# Patient Record
Sex: MALE | Race: WHITE | NOT HISPANIC OR LATINO
[De-identification: names, ages, dates, MRNs, and addresses within clinical notes are randomized per-mention and may not be internally consistent; named-entity substitution may affect disease eponyms.]

---

## 2018-03-07 ENCOUNTER — TRANSCRIPTION ENCOUNTER (OUTPATIENT)
Age: 48
End: 2018-03-07

## 2021-03-23 ENCOUNTER — EMERGENCY (EMERGENCY)
Facility: HOSPITAL | Age: 51
LOS: 1 days | Discharge: HOME | End: 2021-03-23
Attending: EMERGENCY MEDICINE
Payer: COMMERCIAL

## 2021-03-23 ENCOUNTER — FORM ENCOUNTER (OUTPATIENT)
Age: 51
End: 2021-03-23

## 2021-03-23 VITALS
TEMPERATURE: 98 F | DIASTOLIC BLOOD PRESSURE: 103 MMHG | HEIGHT: 71 IN | WEIGHT: 265 LBS | RESPIRATION RATE: 20 BRPM | SYSTOLIC BLOOD PRESSURE: 177 MMHG | HEART RATE: 62 BPM | OXYGEN SATURATION: 97 %

## 2021-03-23 VITALS
RESPIRATION RATE: 20 BRPM | TEMPERATURE: 98 F | HEART RATE: 67 BPM | DIASTOLIC BLOOD PRESSURE: 72 MMHG | SYSTOLIC BLOOD PRESSURE: 124 MMHG | OXYGEN SATURATION: 98 %

## 2021-03-23 PROBLEM — Z00.00 ENCOUNTER FOR PREVENTIVE HEALTH EXAMINATION: Status: ACTIVE | Noted: 2021-03-23

## 2021-03-23 LAB
ALBUMIN SERPL ELPH-MCNC: 4.8 G/DL — SIGNIFICANT CHANGE UP (ref 3.3–5)
ALP SERPL-CCNC: 65 U/L — SIGNIFICANT CHANGE UP (ref 40–120)
ALT FLD-CCNC: 36 U/L — SIGNIFICANT CHANGE UP (ref 10–45)
ANION GAP SERPL CALC-SCNC: 15 MMOL/L — SIGNIFICANT CHANGE UP (ref 5–17)
AST SERPL-CCNC: 33 U/L — SIGNIFICANT CHANGE UP (ref 10–40)
BASOPHILS # BLD AUTO: 0.06 K/UL — SIGNIFICANT CHANGE UP (ref 0–0.2)
BASOPHILS NFR BLD AUTO: 0.8 % — SIGNIFICANT CHANGE UP (ref 0–2)
BILIRUB SERPL-MCNC: 2.6 MG/DL — HIGH (ref 0.2–1.2)
BUN SERPL-MCNC: 13 MG/DL — SIGNIFICANT CHANGE UP (ref 7–23)
CALCIUM SERPL-MCNC: 9.7 MG/DL — SIGNIFICANT CHANGE UP (ref 8.4–10.5)
CHLORIDE SERPL-SCNC: 102 MMOL/L — SIGNIFICANT CHANGE UP (ref 96–108)
CO2 SERPL-SCNC: 24 MMOL/L — SIGNIFICANT CHANGE UP (ref 22–31)
CREAT SERPL-MCNC: 0.8 MG/DL — SIGNIFICANT CHANGE UP (ref 0.5–1.3)
EOSINOPHIL # BLD AUTO: 0.25 K/UL — SIGNIFICANT CHANGE UP (ref 0–0.5)
EOSINOPHIL NFR BLD AUTO: 3.1 % — SIGNIFICANT CHANGE UP (ref 0–6)
GLUCOSE SERPL-MCNC: 81 MG/DL — SIGNIFICANT CHANGE UP (ref 70–99)
HCT VFR BLD CALC: 47.8 % — SIGNIFICANT CHANGE UP (ref 39–50)
HGB BLD-MCNC: 16.3 G/DL — SIGNIFICANT CHANGE UP (ref 13–17)
IMM GRANULOCYTES NFR BLD AUTO: 0.3 % — SIGNIFICANT CHANGE UP (ref 0–1.5)
LYMPHOCYTES # BLD AUTO: 1.49 K/UL — SIGNIFICANT CHANGE UP (ref 1–3.3)
LYMPHOCYTES # BLD AUTO: 18.8 % — SIGNIFICANT CHANGE UP (ref 13–44)
MCHC RBC-ENTMCNC: 30.1 PG — SIGNIFICANT CHANGE UP (ref 27–34)
MCHC RBC-ENTMCNC: 34.1 GM/DL — SIGNIFICANT CHANGE UP (ref 32–36)
MCV RBC AUTO: 88.4 FL — SIGNIFICANT CHANGE UP (ref 80–100)
MONOCYTES # BLD AUTO: 0.56 K/UL — SIGNIFICANT CHANGE UP (ref 0–0.9)
MONOCYTES NFR BLD AUTO: 7.1 % — SIGNIFICANT CHANGE UP (ref 2–14)
NEUTROPHILS # BLD AUTO: 5.56 K/UL — SIGNIFICANT CHANGE UP (ref 1.8–7.4)
NEUTROPHILS NFR BLD AUTO: 69.9 % — SIGNIFICANT CHANGE UP (ref 43–77)
NRBC # BLD: 0 /100 WBCS — SIGNIFICANT CHANGE UP (ref 0–0)
PLATELET # BLD AUTO: 190 K/UL — SIGNIFICANT CHANGE UP (ref 150–400)
POTASSIUM SERPL-MCNC: 5 MMOL/L — SIGNIFICANT CHANGE UP (ref 3.5–5.3)
POTASSIUM SERPL-SCNC: 5 MMOL/L — SIGNIFICANT CHANGE UP (ref 3.5–5.3)
PROT SERPL-MCNC: 8.1 G/DL — SIGNIFICANT CHANGE UP (ref 6–8.3)
RBC # BLD: 5.41 M/UL — SIGNIFICANT CHANGE UP (ref 4.2–5.8)
RBC # FLD: 12.8 % — SIGNIFICANT CHANGE UP (ref 10.3–14.5)
SODIUM SERPL-SCNC: 141 MMOL/L — SIGNIFICANT CHANGE UP (ref 135–145)
TROPONIN T, HIGH SENSITIVITY RESULT: 6 NG/L — SIGNIFICANT CHANGE UP (ref 0–51)
TSH SERPL-MCNC: 2.18 UIU/ML — SIGNIFICANT CHANGE UP (ref 0.27–4.2)
WBC # BLD: 7.94 K/UL — SIGNIFICANT CHANGE UP (ref 3.8–10.5)
WBC # FLD AUTO: 7.94 K/UL — SIGNIFICANT CHANGE UP (ref 3.8–10.5)

## 2021-03-23 PROCEDURE — 99285 EMERGENCY DEPT VISIT HI MDM: CPT

## 2021-03-23 PROCEDURE — 99284 EMERGENCY DEPT VISIT MOD MDM: CPT | Mod: 25

## 2021-03-23 PROCEDURE — 80053 COMPREHEN METABOLIC PANEL: CPT

## 2021-03-23 PROCEDURE — 85025 COMPLETE CBC W/AUTO DIFF WBC: CPT

## 2021-03-23 PROCEDURE — 36000 PLACE NEEDLE IN VEIN: CPT

## 2021-03-23 PROCEDURE — 71046 X-RAY EXAM CHEST 2 VIEWS: CPT

## 2021-03-23 PROCEDURE — 84484 ASSAY OF TROPONIN QUANT: CPT

## 2021-03-23 PROCEDURE — 71046 X-RAY EXAM CHEST 2 VIEWS: CPT | Mod: 26

## 2021-03-23 PROCEDURE — 93010 ELECTROCARDIOGRAM REPORT: CPT

## 2021-03-23 PROCEDURE — 93005 ELECTROCARDIOGRAM TRACING: CPT

## 2021-03-23 PROCEDURE — 84443 ASSAY THYROID STIM HORMONE: CPT

## 2021-03-23 RX ORDER — METOPROLOL TARTRATE 50 MG
1 TABLET ORAL
Qty: 14 | Refills: 0
Start: 2021-03-23 | End: 2021-03-29

## 2021-03-23 NOTE — ED PROVIDER NOTE - OBJECTIVE STATEMENT
49 y/o M with no reported PMH presenting with palpitations. Patient states has been having intermittent palpitations since last night. Feels like heart is beating fast. Does not have any associated cp, sob, lightheadedness, n/v, abd pain. Not associated with exertion. Patient notes he had 3 episodes of diarrhea yesterday, has been hydrating since. No episode of similar hx in past.  No known hx of thyroid abnormalities

## 2021-03-23 NOTE — ED PROVIDER NOTE - NS ED ROS FT
CONSTITUTIONAL: No fevers, no chills, no lightheadedness, no dizziness  Eyes: no visual changes  Ears: no ear drainage, no ear pain  Nose: no nasal congestion  Mouth/Throat: no sore throat  CV: +palpitations, No chest pain  PULM: No SOB, no cough  GI: No n/v/d, no abd pain  : no dysuria, no hematuria  SKIN: no rashes.  NEURO: no headache, no focal weakness or numbness  LYMPH/VASC: no LE swelling

## 2021-03-23 NOTE — ED PROVIDER NOTE - PHYSICAL EXAMINATION
gen: obese  Mentation: AAO x 3  psych: mood appropriate  ENT: airway patent  Eyes: conjunctivae clear bilaterally  Cardio: RRR, no m/r/g  Resp: normal BS b/l  GI: s/nt/nd  : no CVA tenderness  Neuro: sensation and motor function intact  Skin: No evidence of rash  MSK: normal movement of all extremities  Lymph/Vasc: no LE edema

## 2021-03-23 NOTE — ED PROVIDER NOTE - NSFOLLOWUPINSTRUCTIONS_ED_ALL_ED_FT
Palpitations    A palpitation is the feeling that your heartbeat is irregular or is faster than normal. It may feel like your heart is fluttering or skipping a beat. They may be caused by many things, including smoking, caffeine, alcohol, stress, and certain medicines. Although most causes of palpitations are not serious, palpitations can be a sign of a serious medical problem. Avoid caffeine, alcohol, and tobacco products at home. Try to reduce stress and anxiety and make sure to get plenty of rest.     SEEK IMMEDIATE MEDICAL CARE IF YOU HAVE ANY OF THE FOLLOWING SYMPTOMS: chest pain, shortness of breath, severe headache, dizziness/lightheadedness, or fainting.    Please follow up with cardiology within the next 2-3 days. Your information has been given to our discharge team, someone will contact you to make an appointment. You need a Holter monitor and and echocardiogram

## 2021-03-23 NOTE — ED PROVIDER NOTE - PATIENT PORTAL LINK FT
You can access the FollowMyHealth Patient Portal offered by [facility] by registering at the following website: [email or web address]. By joining [employer]’s FollowMyHealth portal, you will also be able to view your health information using other applications (apps) compatible with our system.

## 2021-03-23 NOTE — ED PROVIDER NOTE - CLINICAL SUMMARY MEDICAL DECISION MAKING FREE TEXT BOX
DO Deborah PGY-2: 49 y/o M presenting with intermittent palpitations yesterday, currently in NSR. Will keep on cardiac monitor. Will eval for electrolyte abnormalities, cardiac ischemia. Dispo pending results : 49 y/o M account  morbidly obese  presenting with intermittent palpitations yesterday, currently in NSR. Will keep on cardiac monitor. Will eval for electrolyte abnormalities, cardiac ischemia. Dispo pending results ZR

## 2021-03-23 NOTE — ED ADULT NURSE NOTE - OBJECTIVE STATEMENT
Patient  is  alert  and  oriented x3.  Color  is  good  and  skin warm  to touch.  He   is  c/o  palpitations. He  denies  chest pain  or  SOB

## 2021-03-24 ENCOUNTER — APPOINTMENT (OUTPATIENT)
Dept: CARDIOLOGY | Facility: CLINIC | Age: 51
End: 2021-03-24
Payer: COMMERCIAL

## 2021-03-24 ENCOUNTER — NON-APPOINTMENT (OUTPATIENT)
Age: 51
End: 2021-03-24

## 2021-03-24 ENCOUNTER — APPOINTMENT (OUTPATIENT)
Dept: ELECTROPHYSIOLOGY | Facility: CLINIC | Age: 51
End: 2021-03-24
Payer: COMMERCIAL

## 2021-03-24 VITALS
SYSTOLIC BLOOD PRESSURE: 139 MMHG | WEIGHT: 264 LBS | OXYGEN SATURATION: 96 % | HEIGHT: 71 IN | DIASTOLIC BLOOD PRESSURE: 90 MMHG | HEART RATE: 83 BPM | BODY MASS INDEX: 36.96 KG/M2

## 2021-03-24 DIAGNOSIS — E66.9 OBESITY, UNSPECIFIED: ICD-10-CM

## 2021-03-24 DIAGNOSIS — R00.2 PALPITATIONS: ICD-10-CM

## 2021-03-24 DIAGNOSIS — Z82.49 FAMILY HISTORY OF ISCHEMIC HEART DISEASE AND OTHER DISEASES OF THE CIRCULATORY SYSTEM: ICD-10-CM

## 2021-03-24 DIAGNOSIS — E78.00 PURE HYPERCHOLESTEROLEMIA, UNSPECIFIED: ICD-10-CM

## 2021-03-24 DIAGNOSIS — Z78.9 OTHER SPECIFIED HEALTH STATUS: ICD-10-CM

## 2021-03-24 PROCEDURE — 99072 ADDL SUPL MATRL&STAF TM PHE: CPT

## 2021-03-24 PROCEDURE — 99203 OFFICE O/P NEW LOW 30 MIN: CPT

## 2021-03-24 PROCEDURE — 93000 ELECTROCARDIOGRAM COMPLETE: CPT

## 2021-03-24 NOTE — HISTORY OF PRESENT ILLNESS
[FreeTextEntry1] : 50-year old man with a history of obesity, seen in the emergency room yesterday. \par \par The medical note from the ER reads as follows: "Chief Complaint: The patient is a 50y Male complaining of:palpitations. Patient states has been having intermittent palpitations since last night. Feels like heart is beating fast. Does not have any associated cp, sob, lightheadedness, n/v, abd pain. Not associated with exertion.No known hx of thyroid abnormalities".\par \par Mr. Kendrick tells me that he has noted intermittent episodes of the sensation of a "flutter" in the chest; several episodes lasting less than two minutes. These started two days ago. He was seen at both an [facility]urgicare" center and the [facility] emergency department; no abnormal rhythms were detected. He has not experienced syncope, lightheadedness, dyspnea, or chest pain. \par \par There is no history of smoking or type-2 diabetes. Lipids in January (patient has these results on his smart phone): (01/07/2021): Total cholesterol 209 mg%, /, H42 mg%. His father had a myocardial infarction in his late 50s, and several stents.  \par \par ADRIANA Screening: Snores; often tired during the day. When gets home from work, dozes off "very easily".\par \par

## 2021-03-24 NOTE — PHYSICAL EXAM
[Normal Conjunctiva] : the conjunctiva exhibited no abnormalities [Heart Sounds] : normal S1 and S2 [Murmurs] : no murmurs present [Edema] : no peripheral edema present [] : no respiratory distress [Respiration, Rhythm And Depth] : normal respiratory rhythm and effort [Exaggerated Use Of Accessory Muscles For Inspiration] : no accessory muscle use [Auscultation Breath Sounds / Voice Sounds] : lungs were clear to auscultation bilaterally [Abdomen Soft] : soft [Abnormal Walk] : normal gait [Nail Clubbing] : no clubbing of the fingernails [Cyanosis, Localized] : no localized cyanosis [Skin Color & Pigmentation] : normal skin color and pigmentation [Oriented To Time, Place, And Person] : oriented to person, place, and time [Impaired Insight] : insight and judgment were intact [Affect] : the affect was normal [Mood] : the mood was normal [Memory Recent] : recent memory was not impaired [Memory Remote] : remote memory was not impaired [No Anxiety] : not feeling anxious [FreeTextEntry1] : Obese.

## 2021-03-24 NOTE — ASSESSMENT
[FreeTextEntry1] : Problems:\par Palpitations - sounds real (i.e. does not sound like anxiety). Furthermore, obesity is associated with sleep apnea and in turn that is associated with atrial fibrillation.\par Probably hypertensive, given age and weight \par Obesity\par Possible ADRIANA\par Hyperlipidemia

## 2021-03-24 NOTE — DISCUSSION/SUMMARY
COVID-19 Screening [NEGATIVE];     E-Visit Submission: Redness of eye    Patient submitted an E-Visit Request and was disqualified.     Appointment for phone visit for 5/13      Question: Please enter the details of your  past eye problems   Patient:   Painful between eye and tip of nose  all day and when I wake up from sleeping my  eye is stuck. I went to the emergency room at  [facility] in [place] on 4/27/20  and was  diagnosed with Acquired nasolacrimal duct  obstruction, left. They did the strip and drop in  the eye (fluorescein,tetracaine) it felt better for  about a week with the warm compress but it’s  hurting again. They said if it don’t get better I  need a referral to see Ophthalmology.       [FreeTextEntry1] : Plan:\par Check BP at home regularly; depending on readings, may need to institute therapy. \par Start Crestor 20 mg/d.\par Screen for ADRIANA .\par Ambulatory telemetry for three weeks.\par Prudent diet (discussed at length) and exercise as tolerated.\par Repeat lipids and check HbA1c in three months.\par

## 2021-04-22 PROCEDURE — 93228 REMOTE 30 DAY ECG REV/REPORT: CPT

## 2021-04-22 PROCEDURE — 99072 ADDL SUPL MATRL&STAF TM PHE: CPT

## 2021-09-09 ENCOUNTER — INPATIENT (INPATIENT)
Facility: HOSPITAL | Age: 51
LOS: 2 days | Discharge: HOME | DRG: 418 | End: 2021-09-12
Attending: SURGERY | Admitting: SURGERY
Payer: COMMERCIAL

## 2021-09-09 VITALS
TEMPERATURE: 98 F | DIASTOLIC BLOOD PRESSURE: 99 MMHG | HEART RATE: 62 BPM | SYSTOLIC BLOOD PRESSURE: 108 MMHG | OXYGEN SATURATION: 97 % | RESPIRATION RATE: 16 BRPM | HEIGHT: 71 IN | WEIGHT: 259.93 LBS

## 2021-09-09 DIAGNOSIS — R10.9 UNSPECIFIED ABDOMINAL PAIN: ICD-10-CM

## 2021-09-09 PROBLEM — E66.01 MORBID (SEVERE) OBESITY DUE TO EXCESS CALORIES: Chronic | Status: ACTIVE | Noted: 2021-03-23

## 2021-09-09 LAB
ALBUMIN SERPL ELPH-MCNC: 4.9 G/DL — SIGNIFICANT CHANGE UP (ref 3.3–5)
ALP SERPL-CCNC: 70 U/L — SIGNIFICANT CHANGE UP (ref 40–120)
ALT FLD-CCNC: 26 U/L — SIGNIFICANT CHANGE UP (ref 10–45)
ANION GAP SERPL CALC-SCNC: 15 MMOL/L — SIGNIFICANT CHANGE UP (ref 5–17)
APPEARANCE UR: CLEAR — SIGNIFICANT CHANGE UP
AST SERPL-CCNC: 26 U/L — SIGNIFICANT CHANGE UP (ref 10–40)
BACTERIA # UR AUTO: NEGATIVE — SIGNIFICANT CHANGE UP
BASOPHILS # BLD AUTO: 0.08 K/UL — SIGNIFICANT CHANGE UP (ref 0–0.2)
BASOPHILS NFR BLD AUTO: 0.6 % — SIGNIFICANT CHANGE UP (ref 0–2)
BILIRUB DIRECT SERPL-MCNC: 0.2 MG/DL — SIGNIFICANT CHANGE UP (ref 0–0.2)
BILIRUB SERPL-MCNC: 1.9 MG/DL — HIGH (ref 0.2–1.2)
BILIRUB UR-MCNC: NEGATIVE — SIGNIFICANT CHANGE UP
BUN SERPL-MCNC: 13 MG/DL — SIGNIFICANT CHANGE UP (ref 7–23)
CALCIUM SERPL-MCNC: 10 MG/DL — SIGNIFICANT CHANGE UP (ref 8.4–10.5)
CHLORIDE SERPL-SCNC: 101 MMOL/L — SIGNIFICANT CHANGE UP (ref 96–108)
CO2 SERPL-SCNC: 21 MMOL/L — LOW (ref 22–31)
COLOR SPEC: SIGNIFICANT CHANGE UP
CREAT SERPL-MCNC: 0.88 MG/DL — SIGNIFICANT CHANGE UP (ref 0.5–1.3)
DIFF PNL FLD: NEGATIVE — SIGNIFICANT CHANGE UP
EOSINOPHIL # BLD AUTO: 0.28 K/UL — SIGNIFICANT CHANGE UP (ref 0–0.5)
EOSINOPHIL NFR BLD AUTO: 2.1 % — SIGNIFICANT CHANGE UP (ref 0–6)
EPI CELLS # UR: 1 /HPF — SIGNIFICANT CHANGE UP
GAS PNL BLDV: SIGNIFICANT CHANGE UP
GLUCOSE SERPL-MCNC: 138 MG/DL — HIGH (ref 70–99)
GLUCOSE UR QL: NEGATIVE — SIGNIFICANT CHANGE UP
HCT VFR BLD CALC: 47.2 % — SIGNIFICANT CHANGE UP (ref 39–50)
HGB BLD-MCNC: 16.1 G/DL — SIGNIFICANT CHANGE UP (ref 13–17)
HYALINE CASTS # UR AUTO: 1 /LPF — SIGNIFICANT CHANGE UP (ref 0–2)
IMM GRANULOCYTES NFR BLD AUTO: 0.3 % — SIGNIFICANT CHANGE UP (ref 0–1.5)
KETONES UR-MCNC: NEGATIVE — SIGNIFICANT CHANGE UP
LEUKOCYTE ESTERASE UR-ACNC: NEGATIVE — SIGNIFICANT CHANGE UP
LIDOCAIN IGE QN: 19 U/L — SIGNIFICANT CHANGE UP (ref 7–60)
LYMPHOCYTES # BLD AUTO: 1.71 K/UL — SIGNIFICANT CHANGE UP (ref 1–3.3)
LYMPHOCYTES # BLD AUTO: 12.9 % — LOW (ref 13–44)
MCHC RBC-ENTMCNC: 29.8 PG — SIGNIFICANT CHANGE UP (ref 27–34)
MCHC RBC-ENTMCNC: 34.1 GM/DL — SIGNIFICANT CHANGE UP (ref 32–36)
MCV RBC AUTO: 87.2 FL — SIGNIFICANT CHANGE UP (ref 80–100)
MONOCYTES # BLD AUTO: 0.78 K/UL — SIGNIFICANT CHANGE UP (ref 0–0.9)
MONOCYTES NFR BLD AUTO: 5.9 % — SIGNIFICANT CHANGE UP (ref 2–14)
NEUTROPHILS # BLD AUTO: 10.33 K/UL — HIGH (ref 1.8–7.4)
NEUTROPHILS NFR BLD AUTO: 78.2 % — HIGH (ref 43–77)
NITRITE UR-MCNC: NEGATIVE — SIGNIFICANT CHANGE UP
NRBC # BLD: 0 /100 WBCS — SIGNIFICANT CHANGE UP (ref 0–0)
PH UR: 7 — SIGNIFICANT CHANGE UP (ref 5–8)
PLATELET # BLD AUTO: 222 K/UL — SIGNIFICANT CHANGE UP (ref 150–400)
POTASSIUM SERPL-MCNC: 4.1 MMOL/L — SIGNIFICANT CHANGE UP (ref 3.5–5.3)
POTASSIUM SERPL-SCNC: 4.1 MMOL/L — SIGNIFICANT CHANGE UP (ref 3.5–5.3)
PROT SERPL-MCNC: 8.1 G/DL — SIGNIFICANT CHANGE UP (ref 6–8.3)
PROT UR-MCNC: NEGATIVE — SIGNIFICANT CHANGE UP
RBC # BLD: 5.41 M/UL — SIGNIFICANT CHANGE UP (ref 4.2–5.8)
RBC # FLD: 12.8 % — SIGNIFICANT CHANGE UP (ref 10.3–14.5)
RBC CASTS # UR COMP ASSIST: 1 /HPF — SIGNIFICANT CHANGE UP (ref 0–4)
SARS-COV-2 RNA SPEC QL NAA+PROBE: SIGNIFICANT CHANGE UP
SODIUM SERPL-SCNC: 137 MMOL/L — SIGNIFICANT CHANGE UP (ref 135–145)
SP GR SPEC: 1.01 — SIGNIFICANT CHANGE UP (ref 1.01–1.02)
TROPONIN T, HIGH SENSITIVITY RESULT: <6 NG/L — SIGNIFICANT CHANGE UP (ref 0–51)
UROBILINOGEN FLD QL: NEGATIVE — SIGNIFICANT CHANGE UP
WBC # BLD: 13.22 K/UL — HIGH (ref 3.8–10.5)
WBC # FLD AUTO: 13.22 K/UL — HIGH (ref 3.8–10.5)
WBC UR QL: 1 /HPF — SIGNIFICANT CHANGE UP (ref 0–5)

## 2021-09-09 PROCEDURE — 76705 ECHO EXAM OF ABDOMEN: CPT | Mod: 26,RT

## 2021-09-09 PROCEDURE — 99285 EMERGENCY DEPT VISIT HI MDM: CPT | Mod: 25

## 2021-09-09 PROCEDURE — 93010 ELECTROCARDIOGRAM REPORT: CPT

## 2021-09-09 PROCEDURE — 74183 MRI ABD W/O CNTR FLWD CNTR: CPT | Mod: 26

## 2021-09-09 PROCEDURE — 71045 X-RAY EXAM CHEST 1 VIEW: CPT | Mod: 26

## 2021-09-09 RX ORDER — SODIUM CHLORIDE 9 MG/ML
1000 INJECTION, SOLUTION INTRAVENOUS
Refills: 0 | Status: DISCONTINUED | OUTPATIENT
Start: 2021-09-09 | End: 2021-09-11

## 2021-09-09 RX ORDER — SODIUM CHLORIDE 9 MG/ML
1000 INJECTION, SOLUTION INTRAVENOUS ONCE
Refills: 0 | Status: COMPLETED | OUTPATIENT
Start: 2021-09-09 | End: 2021-09-09

## 2021-09-09 RX ORDER — METRONIDAZOLE 500 MG
TABLET ORAL
Refills: 0 | Status: DISCONTINUED | OUTPATIENT
Start: 2021-09-09 | End: 2021-09-11

## 2021-09-09 RX ORDER — METRONIDAZOLE 500 MG
500 TABLET ORAL EVERY 8 HOURS
Refills: 0 | Status: DISCONTINUED | OUTPATIENT
Start: 2021-09-10 | End: 2021-09-11

## 2021-09-09 RX ORDER — ROSUVASTATIN CALCIUM 5 MG/1
1 TABLET ORAL
Qty: 0 | Refills: 0 | DISCHARGE

## 2021-09-09 RX ORDER — FAMOTIDINE 10 MG/ML
20 INJECTION INTRAVENOUS ONCE
Refills: 0 | Status: COMPLETED | OUTPATIENT
Start: 2021-09-09 | End: 2021-09-09

## 2021-09-09 RX ORDER — ACETAMINOPHEN 500 MG
975 TABLET ORAL ONCE
Refills: 0 | Status: COMPLETED | OUTPATIENT
Start: 2021-09-09 | End: 2021-09-09

## 2021-09-09 RX ORDER — ENOXAPARIN SODIUM 100 MG/ML
40 INJECTION SUBCUTANEOUS DAILY
Refills: 0 | Status: DISCONTINUED | OUTPATIENT
Start: 2021-09-09 | End: 2021-09-11

## 2021-09-09 RX ORDER — METRONIDAZOLE 500 MG
500 TABLET ORAL ONCE
Refills: 0 | Status: COMPLETED | OUTPATIENT
Start: 2021-09-09 | End: 2021-09-09

## 2021-09-09 RX ORDER — CIPROFLOXACIN LACTATE 400MG/40ML
400 VIAL (ML) INTRAVENOUS ONCE
Refills: 0 | Status: COMPLETED | OUTPATIENT
Start: 2021-09-09 | End: 2021-09-09

## 2021-09-09 RX ORDER — INFLUENZA VIRUS VACCINE 15; 15; 15; 15 UG/.5ML; UG/.5ML; UG/.5ML; UG/.5ML
0.5 SUSPENSION INTRAMUSCULAR ONCE
Refills: 0 | Status: DISCONTINUED | OUTPATIENT
Start: 2021-09-09 | End: 2021-09-12

## 2021-09-09 RX ORDER — ACETAMINOPHEN 500 MG
1000 TABLET ORAL ONCE
Refills: 0 | Status: COMPLETED | OUTPATIENT
Start: 2021-09-09 | End: 2021-09-09

## 2021-09-09 RX ORDER — ONDANSETRON 8 MG/1
4 TABLET, FILM COATED ORAL ONCE
Refills: 0 | Status: COMPLETED | OUTPATIENT
Start: 2021-09-09 | End: 2021-09-09

## 2021-09-09 RX ORDER — CIPROFLOXACIN LACTATE 400MG/40ML
VIAL (ML) INTRAVENOUS
Refills: 0 | Status: DISCONTINUED | OUTPATIENT
Start: 2021-09-09 | End: 2021-09-11

## 2021-09-09 RX ORDER — CIPROFLOXACIN LACTATE 400MG/40ML
400 VIAL (ML) INTRAVENOUS EVERY 12 HOURS
Refills: 0 | Status: DISCONTINUED | OUTPATIENT
Start: 2021-09-10 | End: 2021-09-11

## 2021-09-09 RX ORDER — ATORVASTATIN CALCIUM 80 MG/1
80 TABLET, FILM COATED ORAL AT BEDTIME
Refills: 0 | Status: DISCONTINUED | OUTPATIENT
Start: 2021-09-09 | End: 2021-09-11

## 2021-09-09 RX ADMIN — SODIUM CHLORIDE 1000 MILLILITER(S): 9 INJECTION, SOLUTION INTRAVENOUS at 03:33

## 2021-09-09 RX ADMIN — Medication 975 MILLIGRAM(S): at 06:23

## 2021-09-09 RX ADMIN — FAMOTIDINE 20 MILLIGRAM(S): 10 INJECTION INTRAVENOUS at 03:33

## 2021-09-09 RX ADMIN — Medication 30 MILLILITER(S): at 03:33

## 2021-09-09 RX ADMIN — Medication 200 MILLIGRAM(S): at 22:26

## 2021-09-09 RX ADMIN — Medication 100 MILLIGRAM(S): at 20:50

## 2021-09-09 RX ADMIN — Medication 1000 MILLIGRAM(S): at 22:48

## 2021-09-09 RX ADMIN — ATORVASTATIN CALCIUM 80 MILLIGRAM(S): 80 TABLET, FILM COATED ORAL at 22:18

## 2021-09-09 RX ADMIN — Medication 400 MILLIGRAM(S): at 22:18

## 2021-09-09 RX ADMIN — ONDANSETRON 4 MILLIGRAM(S): 8 TABLET, FILM COATED ORAL at 03:19

## 2021-09-09 RX ADMIN — SODIUM CHLORIDE 125 MILLILITER(S): 9 INJECTION, SOLUTION INTRAVENOUS at 09:14

## 2021-09-09 NOTE — H&P ADULT - NSHPLABSRESULTS_GEN_ALL_CORE
.  LABS:                         16.1   13.22 )-----------( 222      ( 09 Sep 2021 03:40 )             47.2         137  |  101  |  13  ----------------------------<  138<H>  4.1   |  21<L>  |  0.88    Ca    10.0      09 Sep 2021 03:40    TPro  8.1  /  Alb  4.9  /  TBili  1.9<H>  /  DBili  x   /  AST  26  /  ALT  26  /  AlkPhos  70        Urinalysis Basic - ( 09 Sep 2021 11:33 )    Color: Light Yellow / Appearance: Clear / S.012 / pH: x  Gluc: x / Ketone: Negative  / Bili: Negative / Urobili: Negative   Blood: x / Protein: Negative / Nitrite: Negative   Leuk Esterase: Negative / RBC: 1 /hpf / WBC 1 /HPF   Sq Epi: x / Non Sq Epi: 1 /hpf / Bacteria: Negative            RADIOLOGY, EKG & ADDITIONAL TESTS: < from: US Abdomen Upper Quadrant Right (21 @ 07:54) >      FINDINGS:    Liver: Within normal limits.    Bile ducts: Normal caliber. Common bile duct measures 5 mm.    Gallbladder: Tiny stones and/or sludge. No wall thickening. Equivocal sonographic Webb's sign as patient received pain medication prior to study.    Pancreas: Obscured by bowel gas.    Right kidney: 13.3 cm. No hydronephrosis.    Ascites: None.    IVC: Limited evaluation.    IMPRESSION:    Tiny stones and/or sludge in the gallbladder. Equivocal sonographic Webb's sign as patient received pain medication prior to study. If there is clinical concern for acute cholecystitis, consider nuclear medicine HIDA scan.    Normal caliber biliary tree.    < end of copied text >

## 2021-09-09 NOTE — ED ADULT NURSE REASSESSMENT NOTE - NS ED NURSE REASSESS COMMENT FT1
Patient and family member updated on plan of care. Patient resting comfortably in bed, safety and comfort provided.

## 2021-09-09 NOTE — ED ADULT NURSE REASSESSMENT NOTE - NS ED NURSE REASSESS COMMENT FT1
Patient ambulated independently to the bathroom. Urine obtained and sent to lab. Awaiting to be seen by surgery. Patient verbalizes no needs at this time. Safety and comfort provided.

## 2021-09-09 NOTE — ED PROVIDER NOTE - PROGRESS NOTE DETAILS
Labs with nonspecific leukocytosis and mildly elevated bilirubin.  Troponin/lipase negative.  Still having symptoms despite medications - will obtain RUQ ultrasound.  Musa Woody M.D. Attending MD Quiroga: patient received in sign out. Here with epigastric abd pain, labs with mild leukocytosis and mild t bili elevation 1.9. Pending RUQ US for further evaluation. Dean George, PGY-2- Patient received at sign out. Reassessed at bedside. Feels improved after meds, awaiting US RUQ to r/o hepatobiliary pathology. Will continue to monitor. Attending MD Quiroga: US read pending. Serial abd exam with mild ruq ttp, equivocal murphys. Pt offered analgesia, he declines at this time. Dean George, PGY-2- Patient reevaluated, still with RUQ tenderness on exam. Also mild CVA tenderness. Will obtain UA, UCx, COVID-19 swab. Surgery paged for equivocal cholecystitis. Radiology recommending possible HIDA scan. Awaiting surgery eval. Dean George, PGY-2- Patient to be admitted to surgery. Requesting MRCP. Patient updated

## 2021-09-09 NOTE — ED ADULT NURSE NOTE - NSIMPLEMENTINTERV_GEN_ALL_ED
Implemented All Universal Safety Interventions:  [place] to call system. Call bell, personal items and telephone within reach. Instruct patient to call for assistance. Room bathroom lighting operational. Non-slip footwear when patient is off stretcher. Physically safe environment: no spills, clutter or unnecessary equipment. Stretcher in lowest position, wheels locked, appropriate side rails in place.

## 2021-09-09 NOTE — ED PROVIDER NOTE - ATTENDING CONTRIBUTION TO CARE
Patient seen and evaluated with resident/NP/PA, however HPI, ROS, PE and MDM as documented authored by myself unless otherwise noted- Musa Woody MD

## 2021-09-09 NOTE — H&P ADULT - ASSESSMENT
Patient seen and examined    Discussed with attending, Dr. Kincaid    Admit to ACSunder Dr. Kincaid    Obtain MRCP to r/o choledocholithiasis    Full H&P to follow        Stephanie Valencia MD  PGY2 Consult Resident  ACS/Trauma Surgery  [id number]       - Admit to ACS under Dr. Kincaid  - NPO/IVF  - IV Abx: Cipro/Flagyl  - Obtain MRCP to r/o choledocholithiasis  - f/u AM labs  - DVT PPx: Lovenox    Discussed with attending, Dr. Codi Valencia MD  PGY2 Consult Resident  ACS/Trauma Surgery  [id number]   HPI: 51y M PMH HTN, HLD, ?Gilbert's presents complaining of epigastric pains/RUQ pain since last night. Physical exam, labs (elevated WBC), and imaging concerning for acute cholecystitis. Given elevated T-bili and unconfirmed history of Gilbert's, will obtain MRCP to rule out choledocholithiasis before proceeding with cholecystectomy.      - Admit to ACS under Dr. Kincaid  - NPO/IVF  - IV Abx: Cipro/Flagyl  - Obtain MRCP to r/o choledocholithiasis  - f/u AM labs  - DVT PPx: Lovenox    Discussed with attending, Dr. Codi Valencia MD  PGY2 Consult Resident  ACS/Trauma Surgery  [id number]

## 2021-09-09 NOTE — ED ADULT NURSE NOTE - OBJECTIVE STATEMENT
Pt is a 52y/o male A&Ox3 speaking coherently ambulates independently ID verified who comes in via self w/ wife w/ c/o sudden onset of epigastric pain starting around 2200 last night. Radiates along upper abdomen. Started about an hour after he ate a bowl of strawberries. C/o nausea w/ episode of emesis around midnight and while RN was assessing in gold room @ 0315. Denies diarrhea. Denies any chest pain or SOB, does not appear to be in any acute distress. Placed on cardiac monitor and  for precautions. PIV access obtained. Denies headache, vision changes, fevers, chills, dysuria, hematuria, recent illness travel or fall. All needs met. Safety and comfort measures provided. Bed locked and in lowest position, side rails up for safety. Call bell within reach.

## 2021-09-09 NOTE — H&P ADULT - HISTORY OF PRESENT ILLNESS
HPI: 51y M PMH HTN, HLD, ?Gilbert's presents complaining of epigastric pains/RUQ pain      beginning earlier this evening.  Ate strawberries and whipped cream and not too long afterwards had onset of symptoms.  Associated nausea and vomiting at home.  No similar prior pains, no prior abdominal surgeries.  No diarrhea or fevers associated.  No history of cardiac disease or stents, but does have family history of CAD.  Actively retching/vomiting on arrival to exam room.             HPI: 51y M PMH HTN, HLD, ?Phylliss presents complaining of epigastric pains/RUQ pain since last night. Patient reports eating strawberries and whipped cream around 9PM and symptoms began about an hour after and have persisted since. He vomited once at home and several times in the ED, no longer nauseous. Denies ever having similar pain, aggravating or alleviating factors. Denies chest pain, SOB, fevers, diarrhea, constipation. Denies taking medication for his HTN. He also reports that in the past, a doctor mentioned that he may have Gilbert's disease due to elevated bilirubin on outpatient labs.

## 2021-09-09 NOTE — ED PROVIDER NOTE - PHYSICAL EXAMINATION
Exam:  General: Patient well appearing, vital signs within normal limits  HEENT: airway patent with moist mucous membranes  Cardiac: RRR S1/S2 with strong peripheral pulses  Respiratory: lungs clear without respiratory distress  GI: abdomen soft, obese, minimally tender in RUQ/epigastrum, non distended  Neuro: no gross neurologic deficits  Skin: warm, well perfused  Psych: normal mood and affect

## 2021-09-09 NOTE — H&P ADULT - NSHPPHYSICALEXAM_GEN_ALL_CORE
Physical Exam:  General: NAD, resting comfortably  HEENT: NC/AT, EOMI, normal hearing, no oral lesions, no LAD, neck supple  Pulmonary: normal resp effort, CTA-B  Cardiovascular: NSR, no murmurs  Abdominal: soft, ND/NT, no organomegaly  Extremities: WWP, normal strength, no clubbing/cyanosis/edema  Neuro: A/O x 3, CNs II-XII grossly intact, normal sensation, no focal deficits  Pulses: palpable distal pulses    Vital Signs Last 24 Hrs  T(C): 37.1 (09 Sep 2021 18:41), Max: 37.3 (09 Sep 2021 17:39)  T(F): 98.7 (09 Sep 2021 18:41), Max: 99.1 (09 Sep 2021 17:39)  HR: 96 (09 Sep 2021 18:41) (62 - 96)  BP: 153/88 (09 Sep 2021 18:41) (108/99 - 187/98)  BP(mean): 124 (09 Sep 2021 03:20) (124 - 124)  RR: 18 (09 Sep 2021 18:41) (15 - 20)  SpO2: 95% (09 Sep 2021 18:41) (95% - 100%)

## 2021-09-09 NOTE — ED ADULT NURSE REASSESSMENT NOTE - NS ED NURSE REASSESS COMMENT FT1
Report received from Antonette TURNER in [facility]. Patient sleeping comfortably. Patient reporting that pain "feels like it's going across my stomach now instead of one side." Awaiting US. Safety and comfort provided.

## 2021-09-09 NOTE — ED PROVIDER NOTE - CLINICAL SUMMARY MEDICAL DECISION MAKING FREE TEXT BOX
Patient presenting complaining of epigastric discomfort and nausea, minimally tender exam but still actively symptomatic, non peritoneal.  Will treat symptoms, obtain labs/lipase/LFTs to evaluate for pancreatitis or hepatobiliary etiology of symptoms, EKG, CXR and troponin to r/o ACS and re-evaluate.

## 2021-09-09 NOTE — ED ADULT TRIAGE NOTE - CHIEF COMPLAINT QUOTE
epigastric pain which radiates down to abdomen. started when he laid down for bed, reports eating strawberries right before bed. Mild nausea with single episode of emesis. Denies SOB, diaphoresis

## 2021-09-09 NOTE — ED PROVIDER NOTE - OBJECTIVE STATEMENT
Patient presenting complaining of epigastric pains beginning earlier this evening.  Ate strawberries and whipped cream and not too long afterwards had onset of symptoms.  Associated nausea and vomiting at home.  No similar prior pains, no prior abdominal surgeries.  No diarrhea or fevers associated.  No history of cardiac disease or stents, but does have family history of CAD.  Actively retching/vomiting on arrival to exam room.

## 2021-09-10 ENCOUNTER — TRANSCRIPTION ENCOUNTER (OUTPATIENT)
Age: 51
End: 2021-09-10

## 2021-09-10 LAB
ALBUMIN SERPL ELPH-MCNC: 4.3 G/DL — SIGNIFICANT CHANGE UP (ref 3.3–5)
ALP SERPL-CCNC: 48 U/L — SIGNIFICANT CHANGE UP (ref 40–120)
ALT FLD-CCNC: 18 U/L — SIGNIFICANT CHANGE UP (ref 10–45)
ANION GAP SERPL CALC-SCNC: 13 MMOL/L — SIGNIFICANT CHANGE UP (ref 5–17)
AST SERPL-CCNC: 17 U/L — SIGNIFICANT CHANGE UP (ref 10–40)
BILIRUB DIRECT SERPL-MCNC: 0.3 MG/DL — HIGH (ref 0–0.2)
BILIRUB INDIRECT FLD-MCNC: 4.2 MG/DL — HIGH (ref 0.2–1)
BILIRUB SERPL-MCNC: 4.5 MG/DL — HIGH (ref 0.2–1.2)
BILIRUB SERPL-MCNC: 4.5 MG/DL — HIGH (ref 0.2–1.2)
BUN SERPL-MCNC: 8 MG/DL — SIGNIFICANT CHANGE UP (ref 7–23)
CALCIUM SERPL-MCNC: 9.6 MG/DL — SIGNIFICANT CHANGE UP (ref 8.4–10.5)
CHLORIDE SERPL-SCNC: 100 MMOL/L — SIGNIFICANT CHANGE UP (ref 96–108)
CO2 SERPL-SCNC: 22 MMOL/L — SIGNIFICANT CHANGE UP (ref 22–31)
COVID-19 SPIKE DOMAIN AB INTERP: POSITIVE
COVID-19 SPIKE DOMAIN ANTIBODY RESULT: >250 U/ML — HIGH
CREAT SERPL-MCNC: 0.72 MG/DL — SIGNIFICANT CHANGE UP (ref 0.5–1.3)
CULTURE RESULTS: NO GROWTH — SIGNIFICANT CHANGE UP
GLUCOSE SERPL-MCNC: 106 MG/DL — HIGH (ref 70–99)
HCT VFR BLD CALC: 46.1 % — SIGNIFICANT CHANGE UP (ref 39–50)
HGB BLD-MCNC: 15.5 G/DL — SIGNIFICANT CHANGE UP (ref 13–17)
MAGNESIUM SERPL-MCNC: 1.9 MG/DL — SIGNIFICANT CHANGE UP (ref 1.6–2.6)
MCHC RBC-ENTMCNC: 29.8 PG — SIGNIFICANT CHANGE UP (ref 27–34)
MCHC RBC-ENTMCNC: 33.6 GM/DL — SIGNIFICANT CHANGE UP (ref 32–36)
MCV RBC AUTO: 88.5 FL — SIGNIFICANT CHANGE UP (ref 80–100)
NRBC # BLD: 0 /100 WBCS — SIGNIFICANT CHANGE UP (ref 0–0)
PHOSPHATE SERPL-MCNC: 2.1 MG/DL — LOW (ref 2.5–4.5)
PLATELET # BLD AUTO: 176 K/UL — SIGNIFICANT CHANGE UP (ref 150–400)
POTASSIUM SERPL-MCNC: 3.9 MMOL/L — SIGNIFICANT CHANGE UP (ref 3.5–5.3)
POTASSIUM SERPL-SCNC: 3.9 MMOL/L — SIGNIFICANT CHANGE UP (ref 3.5–5.3)
PROT SERPL-MCNC: 7.3 G/DL — SIGNIFICANT CHANGE UP (ref 6–8.3)
RBC # BLD: 5.21 M/UL — SIGNIFICANT CHANGE UP (ref 4.2–5.8)
RBC # FLD: 13.1 % — SIGNIFICANT CHANGE UP (ref 10.3–14.5)
SARS-COV-2 IGG+IGM SERPL QL IA: >250 U/ML — HIGH
SARS-COV-2 IGG+IGM SERPL QL IA: POSITIVE
SODIUM SERPL-SCNC: 135 MMOL/L — SIGNIFICANT CHANGE UP (ref 135–145)
SPECIMEN SOURCE: SIGNIFICANT CHANGE UP
WBC # BLD: 14.95 K/UL — HIGH (ref 3.8–10.5)
WBC # FLD AUTO: 14.95 K/UL — HIGH (ref 3.8–10.5)

## 2021-09-10 RX ORDER — ACETAMINOPHEN 500 MG
1000 TABLET ORAL ONCE
Refills: 0 | Status: COMPLETED | OUTPATIENT
Start: 2021-09-10 | End: 2021-09-10

## 2021-09-10 RX ADMIN — ATORVASTATIN CALCIUM 80 MILLIGRAM(S): 80 TABLET, FILM COATED ORAL at 22:25

## 2021-09-10 RX ADMIN — Medication 100 MILLIGRAM(S): at 22:25

## 2021-09-10 RX ADMIN — Medication 100 MILLIGRAM(S): at 05:11

## 2021-09-10 RX ADMIN — Medication 200 MILLIGRAM(S): at 05:12

## 2021-09-10 RX ADMIN — Medication 85 MILLIMOLE(S): at 11:45

## 2021-09-10 RX ADMIN — ENOXAPARIN SODIUM 40 MILLIGRAM(S): 100 INJECTION SUBCUTANEOUS at 11:52

## 2021-09-10 RX ADMIN — Medication 200 MILLIGRAM(S): at 18:42

## 2021-09-10 RX ADMIN — Medication 400 MILLIGRAM(S): at 06:21

## 2021-09-10 RX ADMIN — Medication 100 MILLIGRAM(S): at 14:18

## 2021-09-10 NOTE — PROGRESS NOTE ADULT - ASSESSMENT
HPI: 51y M PMH HTN, HLD, ?Gilbert's presents complaining of epigastric pains/RUQ pain since last night. Physical exam, labs (elevated WBC), and imaging concerning for acute cholecystitis. Given elevated T-bili and unconfirmed history of Gilbert's, will obtain MRCP to rule out choledocholithiasis before proceeding with cholecystectomy.      - NPO/IVF  - IV Abx: Cipro/Flagyl  - Obtain MRCP to r/o choledocholithiasis  - f/u GI evaluation  - f/u AM labs  - DVT PPx: Lovenox    ACS #[id number]

## 2021-09-10 NOTE — PROGRESS NOTE ADULT - SUBJECTIVE AND OBJECTIVE BOX
SURGERY DAILY PROGRESS NOTE:     SUBJECTIVE/ROS: Patient seen and examined, admits to moderate RUQ abdominal pain  Denies nausea, vomiting, chest pain, shortness of breath     MEDICATIONS  (STANDING):  atorvastatin 80 milliGRAM(s) Oral at bedtime  ciprofloxacin   IVPB      ciprofloxacin   IVPB 400 milliGRAM(s) IV Intermittent every 12 hours  enoxaparin Injectable 40 milliGRAM(s) SubCutaneous daily  influenza   Vaccine 0.5 milliLiter(s) IntraMuscular once  lactated ringers. 1000 milliLiter(s) (125 mL/Hr) IV Continuous <Continuous>  metroNIDAZOLE  IVPB 500 milliGRAM(s) IV Intermittent every 8 hours  metroNIDAZOLE  IVPB        MEDICATIONS  (PRN):      OBJECTIVE:    Vital Signs Last 24 Hrs  T(C): 37 (10 Sep 2021 05:08), Max: 37.3 (09 Sep 2021 17:39)  T(F): 98.6 (10 Sep 2021 05:08), Max: 99.1 (09 Sep 2021 17:39)  HR: 93 (10 Sep 2021 05:08) (63 - 96)  BP: 142/86 (10 Sep 2021 05:08) (120/75 - 155/87)  BP(mean): --  RR: 18 (10 Sep 2021 05:08) (15 - 20)  SpO2: 95% (10 Sep 2021 05:08) (94% - 100%)        I&O's Detail    09 Sep 2021 07:01  -  10 Sep 2021 07:00  --------------------------------------------------------  IN:    Lactated Ringers: 1500 mL  Total IN: 1500 mL    OUT:    Oral Fluid: 0 mL  Total OUT: 0 mL    Total NET: 1500 mL          Daily     Daily     LABS:                        15.5   14.95 )-----------( 176      ( 10 Sep 2021 07:27 )             46.1     09-10    135  |  100  |  8   ----------------------------<  106<H>  3.9   |  22  |  0.72    Ca    9.6      10 Sep 2021 07:27  Phos  2.1     09-10  Mg     1.9     09-10    TPro  7.3  /  Alb  4.3  /  TBili  4.5<H>  /  DBili  0.3<H>  /  AST  17  /  ALT  18  /  AlkPhos  48  09-10      Urinalysis Basic - ( 09 Sep 2021 11:33 )    Color: Light Yellow / Appearance: Clear / S.012 / pH: x  Gluc: x / Ketone: Negative  / Bili: Negative / Urobili: Negative   Blood: x / Protein: Negative / Nitrite: Negative   Leuk Esterase: Negative / RBC: 1 /hpf / WBC 1 /HPF   Sq Epi: x / Non Sq Epi: 1 /hpf / Bacteria: Negative                PHYSICAL EXAM:  Constitutional: well developed, well nourished, NAD  Respiratory: CTA bilaterally  Gastrointestinal: abdomen soft, mild RUQ tenderness, nondistended. No obvious masses. No peritonitis  Extremities: FROM, warm  Neurological: intact, non-focal  Skin: no gross lesions

## 2021-09-10 NOTE — PROGRESS NOTE ADULT - ASSESSMENT
51y M PMH HTN, HLD, ?Gilbert's presents complaining of epigastric pains/RUQ pain since last night. Physical exam, labs (elevated WBC), and imaging concerning for acute cholecystitis. Given elevated T-bili and unconfirmed history of Gilbert's, will obtain MRCP to rule out choledocholithiasis before proceeding with cholecystectomy.      - F/u MRCP   - NPO/IVF  - IV Abx: Cipro/Flagyl  - f/u AM labs  - DVT PPx: Lovenox    ACS [id number]

## 2021-09-10 NOTE — PROGRESS NOTE ADULT - SUBJECTIVE AND OBJECTIVE BOX
SURGERY DAILY PROGRESS NOTE      SUBJECTIVE: Pt seen and examined at bedside. Admits to still having abdominal pain. Denies chest pain, SOB, palpitations, HA, fever, chills, N/V. MRCP performed last night, awaiting read.      OBJECTIVE:  Vital Signs Last 24 Hrs  T(C): 37 (10 Sep 2021 05:08), Max: 37.3 (09 Sep 2021 17:39)  T(F): 98.6 (10 Sep 2021 05:08), Max: 99.1 (09 Sep 2021 17:39)  HR: 93 (10 Sep 2021 05:08) (63 - 96)  BP: 142/86 (10 Sep 2021 05:08) (120/75 - 155/87)  BP(mean): --  RR: 18 (10 Sep 2021 05:08) (15 - 20)  SpO2: 95% (10 Sep 2021 05:08) (94% - 100%)    I&O's Summary    09 Sep 2021 07:01  -  10 Sep 2021 07:00  --------------------------------------------------------  IN: 1500 mL / OUT: 0 mL / NET: 1500 mL        Physical Exam:  General: NAD, resting comfortably  Cardiovascular: NSR, no murmurs  Abdominal: soft, ND/NT, no organomegaly  Extremities: WWP, normal strength, no clubbing/cyanosis/edema  Neuro: A/O x 3, CNs II-XII grossly intact, normal sensation, no focal deficits        LABS:                        15.5   14.95 )-----------( 176      ( 10 Sep 2021 07:27 )             46.1     09-10    135  |  100  |  8   ----------------------------<  106<H>  3.9   |  22  |  0.72    Ca    9.6      10 Sep 2021 07:27  Phos  2.1     09-10  Mg     1.9     09-10    TPro  7.3  /  Alb  4.3  /  TBili  4.5<H>  /  DBili  0.3<H>  /  AST  17  /  ALT  18  /  AlkPhos  48  09-10      Urinalysis Basic - ( 09 Sep 2021 11:33 )    Color: Light Yellow / Appearance: Clear / S.012 / pH: x  Gluc: x / Ketone: Negative  / Bili: Negative / Urobili: Negative   Blood: x / Protein: Negative / Nitrite: Negative   Leuk Esterase: Negative / RBC: 1 /hpf / WBC 1 /HPF   Sq Epi: x / Non Sq Epi: 1 /hpf / Bacteria: Negative        RADIOLOGY & ADDITIONAL STUDIES:

## 2021-09-11 ENCOUNTER — RESULT REVIEW (OUTPATIENT)
Age: 51
End: 2021-09-11

## 2021-09-11 LAB
ALBUMIN SERPL ELPH-MCNC: 3.9 G/DL — SIGNIFICANT CHANGE UP (ref 3.3–5)
ALP SERPL-CCNC: 53 U/L — SIGNIFICANT CHANGE UP (ref 40–120)
ALT FLD-CCNC: 17 U/L — SIGNIFICANT CHANGE UP (ref 10–45)
ANION GAP SERPL CALC-SCNC: 13 MMOL/L — SIGNIFICANT CHANGE UP (ref 5–17)
AST SERPL-CCNC: 16 U/L — SIGNIFICANT CHANGE UP (ref 10–40)
BILIRUB DIRECT SERPL-MCNC: 0.3 MG/DL — HIGH (ref 0–0.2)
BILIRUB INDIRECT FLD-MCNC: 3.5 MG/DL — HIGH (ref 0.2–1)
BILIRUB SERPL-MCNC: 3.8 MG/DL — HIGH (ref 0.2–1.2)
BLD GP AB SCN SERPL QL: NEGATIVE — SIGNIFICANT CHANGE UP
BUN SERPL-MCNC: 8 MG/DL — SIGNIFICANT CHANGE UP (ref 7–23)
CALCIUM SERPL-MCNC: 9.3 MG/DL — SIGNIFICANT CHANGE UP (ref 8.4–10.5)
CHLORIDE SERPL-SCNC: 99 MMOL/L — SIGNIFICANT CHANGE UP (ref 96–108)
CO2 SERPL-SCNC: 23 MMOL/L — SIGNIFICANT CHANGE UP (ref 22–31)
CREAT SERPL-MCNC: 0.77 MG/DL — SIGNIFICANT CHANGE UP (ref 0.5–1.3)
GLUCOSE SERPL-MCNC: 102 MG/DL — HIGH (ref 70–99)
HCT VFR BLD CALC: 42.9 % — SIGNIFICANT CHANGE UP (ref 39–50)
HGB BLD-MCNC: 14.6 G/DL — SIGNIFICANT CHANGE UP (ref 13–17)
MAGNESIUM SERPL-MCNC: 2.1 MG/DL — SIGNIFICANT CHANGE UP (ref 1.6–2.6)
MCHC RBC-ENTMCNC: 30.7 PG — SIGNIFICANT CHANGE UP (ref 27–34)
MCHC RBC-ENTMCNC: 34 GM/DL — SIGNIFICANT CHANGE UP (ref 32–36)
MCV RBC AUTO: 90.3 FL — SIGNIFICANT CHANGE UP (ref 80–100)
NRBC # BLD: 0 /100 WBCS — SIGNIFICANT CHANGE UP (ref 0–0)
PHOSPHATE SERPL-MCNC: 2.4 MG/DL — LOW (ref 2.5–4.5)
PLATELET # BLD AUTO: 175 K/UL — SIGNIFICANT CHANGE UP (ref 150–400)
POTASSIUM SERPL-MCNC: 3.7 MMOL/L — SIGNIFICANT CHANGE UP (ref 3.5–5.3)
POTASSIUM SERPL-SCNC: 3.7 MMOL/L — SIGNIFICANT CHANGE UP (ref 3.5–5.3)
PROT SERPL-MCNC: 7 G/DL — SIGNIFICANT CHANGE UP (ref 6–8.3)
RBC # BLD: 4.75 M/UL — SIGNIFICANT CHANGE UP (ref 4.2–5.8)
RBC # FLD: 12.9 % — SIGNIFICANT CHANGE UP (ref 10.3–14.5)
RH IG SCN BLD-IMP: POSITIVE — SIGNIFICANT CHANGE UP
SODIUM SERPL-SCNC: 135 MMOL/L — SIGNIFICANT CHANGE UP (ref 135–145)
WBC # BLD: 12.47 K/UL — HIGH (ref 3.8–10.5)
WBC # FLD AUTO: 12.47 K/UL — HIGH (ref 3.8–10.5)

## 2021-09-11 PROCEDURE — U0003: CPT

## 2021-09-11 PROCEDURE — 86769 SARS-COV-2 COVID-19 ANTIBODY: CPT

## 2021-09-11 PROCEDURE — 82247 BILIRUBIN TOTAL: CPT

## 2021-09-11 PROCEDURE — 84295 ASSAY OF SERUM SODIUM: CPT

## 2021-09-11 PROCEDURE — 87086 URINE CULTURE/COLONY COUNT: CPT

## 2021-09-11 PROCEDURE — 93005 ELECTROCARDIOGRAM TRACING: CPT

## 2021-09-11 PROCEDURE — 99285 EMERGENCY DEPT VISIT HI MDM: CPT

## 2021-09-11 PROCEDURE — 84100 ASSAY OF PHOSPHORUS: CPT

## 2021-09-11 PROCEDURE — 85018 HEMOGLOBIN: CPT

## 2021-09-11 PROCEDURE — 82803 BLOOD GASES ANY COMBINATION: CPT

## 2021-09-11 PROCEDURE — 88304 TISSUE EXAM BY PATHOLOGIST: CPT

## 2021-09-11 PROCEDURE — 81001 URINALYSIS AUTO W/SCOPE: CPT

## 2021-09-11 PROCEDURE — 84484 ASSAY OF TROPONIN QUANT: CPT

## 2021-09-11 PROCEDURE — 74183 MRI ABD W/O CNTR FLWD CNTR: CPT

## 2021-09-11 PROCEDURE — 76705 ECHO EXAM OF ABDOMEN: CPT

## 2021-09-11 PROCEDURE — 85014 HEMATOCRIT: CPT

## 2021-09-11 PROCEDURE — 85025 COMPLETE CBC W/AUTO DIFF WBC: CPT

## 2021-09-11 PROCEDURE — 82330 ASSAY OF CALCIUM: CPT

## 2021-09-11 PROCEDURE — 84132 ASSAY OF SERUM POTASSIUM: CPT

## 2021-09-11 PROCEDURE — 85027 COMPLETE CBC AUTOMATED: CPT

## 2021-09-11 PROCEDURE — 82248 BILIRUBIN DIRECT: CPT

## 2021-09-11 PROCEDURE — 86850 RBC ANTIBODY SCREEN: CPT

## 2021-09-11 PROCEDURE — 82947 ASSAY GLUCOSE BLOOD QUANT: CPT

## 2021-09-11 PROCEDURE — 83690 ASSAY OF LIPASE: CPT

## 2021-09-11 PROCEDURE — 88304 TISSUE EXAM BY PATHOLOGIST: CPT | Mod: 26

## 2021-09-11 PROCEDURE — 86900 BLOOD TYPING SEROLOGIC ABO: CPT

## 2021-09-11 PROCEDURE — 80048 BASIC METABOLIC PNL TOTAL CA: CPT

## 2021-09-11 PROCEDURE — 80053 COMPREHEN METABOLIC PANEL: CPT

## 2021-09-11 PROCEDURE — C1889: CPT

## 2021-09-11 PROCEDURE — 96374 THER/PROPH/DIAG INJ IV PUSH: CPT

## 2021-09-11 PROCEDURE — 71045 X-RAY EXAM CHEST 1 VIEW: CPT

## 2021-09-11 PROCEDURE — 83735 ASSAY OF MAGNESIUM: CPT

## 2021-09-11 PROCEDURE — 86901 BLOOD TYPING SEROLOGIC RH(D): CPT

## 2021-09-11 PROCEDURE — A9585: CPT

## 2021-09-11 PROCEDURE — 47562 LAPAROSCOPIC CHOLECYSTECTOMY: CPT

## 2021-09-11 PROCEDURE — U0005: CPT

## 2021-09-11 PROCEDURE — 83605 ASSAY OF LACTIC ACID: CPT

## 2021-09-11 PROCEDURE — 82435 ASSAY OF BLOOD CHLORIDE: CPT

## 2021-09-11 PROCEDURE — 80076 HEPATIC FUNCTION PANEL: CPT

## 2021-09-11 PROCEDURE — C9399: CPT

## 2021-09-11 RX ORDER — OXYCODONE HYDROCHLORIDE 5 MG/1
10 TABLET ORAL EVERY 6 HOURS
Refills: 0 | Status: DISCONTINUED | OUTPATIENT
Start: 2021-09-11 | End: 2021-09-12

## 2021-09-11 RX ORDER — POTASSIUM PHOSPHATE, MONOBASIC POTASSIUM PHOSPHATE, DIBASIC 236; 224 MG/ML; MG/ML
30 INJECTION, SOLUTION INTRAVENOUS ONCE
Refills: 0 | Status: COMPLETED | OUTPATIENT
Start: 2021-09-11 | End: 2021-09-11

## 2021-09-11 RX ORDER — OXYCODONE HYDROCHLORIDE 5 MG/1
5 TABLET ORAL EVERY 6 HOURS
Refills: 0 | Status: DISCONTINUED | OUTPATIENT
Start: 2021-09-11 | End: 2021-09-12

## 2021-09-11 RX ORDER — ACETAMINOPHEN 500 MG
650 TABLET ORAL EVERY 6 HOURS
Refills: 0 | Status: DISCONTINUED | OUTPATIENT
Start: 2021-09-11 | End: 2021-09-12

## 2021-09-11 RX ORDER — SODIUM CHLORIDE 9 MG/ML
1000 INJECTION, SOLUTION INTRAVENOUS
Refills: 0 | Status: DISCONTINUED | OUTPATIENT
Start: 2021-09-11 | End: 2021-09-11

## 2021-09-11 RX ORDER — ENOXAPARIN SODIUM 100 MG/ML
40 INJECTION SUBCUTANEOUS DAILY
Refills: 0 | Status: DISCONTINUED | OUTPATIENT
Start: 2021-09-11 | End: 2021-09-12

## 2021-09-11 RX ORDER — DEXTROSE MONOHYDRATE, SODIUM CHLORIDE, AND POTASSIUM CHLORIDE 50; .745; 4.5 G/1000ML; G/1000ML; G/1000ML
1000 INJECTION, SOLUTION INTRAVENOUS
Refills: 0 | Status: DISCONTINUED | OUTPATIENT
Start: 2021-09-11 | End: 2021-09-11

## 2021-09-11 RX ORDER — SODIUM CHLORIDE 9 MG/ML
3 INJECTION INTRAMUSCULAR; INTRAVENOUS; SUBCUTANEOUS EVERY 8 HOURS
Refills: 0 | Status: DISCONTINUED | OUTPATIENT
Start: 2021-09-11 | End: 2021-09-12

## 2021-09-11 RX ADMIN — Medication 200 MILLIGRAM(S): at 06:37

## 2021-09-11 RX ADMIN — POTASSIUM PHOSPHATE, MONOBASIC POTASSIUM PHOSPHATE, DIBASIC 83.33 MILLIMOLE(S): 236; 224 INJECTION, SOLUTION INTRAVENOUS at 08:31

## 2021-09-11 RX ADMIN — ENOXAPARIN SODIUM 40 MILLIGRAM(S): 100 INJECTION SUBCUTANEOUS at 18:20

## 2021-09-11 RX ADMIN — Medication 650 MILLIGRAM(S): at 18:22

## 2021-09-11 RX ADMIN — Medication 100 MILLIGRAM(S): at 05:04

## 2021-09-11 RX ADMIN — SODIUM CHLORIDE 3 MILLILITER(S): 9 INJECTION INTRAMUSCULAR; INTRAVENOUS; SUBCUTANEOUS at 21:14

## 2021-09-11 RX ADMIN — Medication 650 MILLIGRAM(S): at 19:18

## 2021-09-11 RX ADMIN — DEXTROSE MONOHYDRATE, SODIUM CHLORIDE, AND POTASSIUM CHLORIDE 125 MILLILITER(S): 50; .745; 4.5 INJECTION, SOLUTION INTRAVENOUS at 08:30

## 2021-09-11 NOTE — BRIEF OPERATIVE NOTE - NSICDXBRIEFPROCEDURE_GEN_ALL_CORE_FT
PROCEDURES:  Laparoscopic cholecystectomy using multiple ports 11-Sep-2021 11:54:07  Arti Alvarado   Yes

## 2021-09-11 NOTE — CHART NOTE - NSCHARTNOTEFT_GEN_A_CORE
Post Operative Note  Patient: TRINI RODRIGUEZ 51y (1970) Male   MRN: [id number]  Location: [facility] 9MON 909 W1  Visit: 09-09-21 Inpatient  Date: 09-11-21 @ 15:34    Procedure: S/P dwayne jimenez    Subjective:   Seen and examined 4 hrs postop. No acute events in the immediate postop period. Reports mild abdominal pain. No chest pain, SOB, nausea or vomiting.     Objective:  Vitals: T(F): 98.3 (09-11-21 @ 14:00), Max: 99.4 (09-10-21 @ 17:03)  HR: 83 (09-11-21 @ 14:00)  BP: 143/88 (09-11-21 @ 14:00) (132/83 - 156/80)  RR: 18 (09-11-21 @ 14:00)  SpO2: 91% (09-11-21 @ 14:00)  Vent Settings:     In:   09-10-21 @ 07:01  -  09-11-21 @ 07:00  --------------------------------------------------------  IN: 3075 mL      IV Fluids: lactated ringers. 1000 milliLiter(s) (75 mL/Hr) IV Continuous <Continuous>      Out:   09-10-21 @ 07:01  -  09-11-21 @ 07:00  --------------------------------------------------------  OUT: 0 mL      EBL: 15cc    Voided Urine:   09-10-21 @ 07:01  -  09-11-21 @ 07:00  --------------------------------------------------------  OUT: 0 mL      Calle Catheter: no         Physical Examination:  General: NAD, resting comfortably in bed  HEENT: Normocephalic atraumatic  Respiratory: Nonlabored respirations, normal CW expansion.  Cardio: regular rate and rhythm.  Abdomen: softly distended, appropriately tender, surgical incisions are c/d/i.   Vascular: extremities are warm and well perfused.     Imaging:  No post-op imaging studies    Assessment:  51yMale patient S/P lap tony for acute cholecystitis     Plan:  - Pain control PRN  - Diet: LRD   - Activity: as tolerated   - DVT ppx: LVX     Date/Time: 09-11-21 @ 15:34    ACS  [id number]

## 2021-09-11 NOTE — PACU DISCHARGE NOTE - THE ANESTHESIA ORDERS USED IN THE PACU ORDER SET WILL BE DISCONTINUED UPON TRANSFER OF THIS PATIENT
Patient aware. This has been corrected. ACL was contacted and the charge was removed. Correct order was entered.    Statement Selected

## 2021-09-11 NOTE — PROGRESS NOTE ADULT - ASSESSMENT
HPI: 51y M PMH HTN, HLD, ?Gilbert's presents complaining of epigastric pains/RUQ pain since last night. Physical exam, labs (elevated WBC), and imaging concerning for acute cholecystitis. Given elevated T-bili and unconfirmed history of Gilbert's, will obtain MRCP to rule out choledocholithiasis before proceeding with cholecystectomy.      - NPO/IVF  - IV Abx: Cipro/Flagyl  - Obtain MRCP to r/o choledocholithiasis  - f/u GI evaluation  - f/u AM labs  - DVT PPx: Lovenox    ACS #[id number] HPI: 51y M PMH HTN, HLD, ?Gilbert's presents complaining of epigastric pains/RUQ pain since last night. Physical exam, labs (elevated WBC), and imaging concerning for acute cholecystitis. Given elevated T-bili and unconfirmed history of Gilbert's, will obtain MRCP to rule out choledocholithiasis before proceeding with cholecystectomy.        - OR today for a lap tony, possible open  - NPO/IVF  - IV Abx: Cipro/Flagyl  - f/u AM labs  - DVT PPx: Lovenox  - fu am labs, replete prn      ACS #[id number]

## 2021-09-11 NOTE — PROGRESS NOTE ADULT - SUBJECTIVE AND OBJECTIVE BOX
SURGERY DAILY PROGRESS NOTE:     SUBJECTIVE/ROS: Patient seen and examined, admits to moderate RUQ abdominal pain  Denies nausea, vomiting, chest pain, shortness of breath     MEDICATIONS  (STANDING):  atorvastatin 80 milliGRAM(s) Oral at bedtime  ciprofloxacin   IVPB      ciprofloxacin   IVPB 400 milliGRAM(s) IV Intermittent every 12 hours  enoxaparin Injectable 40 milliGRAM(s) SubCutaneous daily  influenza   Vaccine 0.5 milliLiter(s) IntraMuscular once  lactated ringers. 1000 milliLiter(s) (125 mL/Hr) IV Continuous <Continuous>  metroNIDAZOLE  IVPB 500 milliGRAM(s) IV Intermittent every 8 hours  metroNIDAZOLE  IVPB        MEDICATIONS  (PRN):      OBJECTIVE:    Vital Signs Last 24 Hrs  T(C): 37 (10 Sep 2021 05:08), Max: 37.3 (09 Sep 2021 17:39)  T(F): 98.6 (10 Sep 2021 05:08), Max: 99.1 (09 Sep 2021 17:39)  HR: 93 (10 Sep 2021 05:08) (63 - 96)  BP: 142/86 (10 Sep 2021 05:08) (120/75 - 155/87)  BP(mean): --  RR: 18 (10 Sep 2021 05:08) (15 - 20)  SpO2: 95% (10 Sep 2021 05:08) (94% - 100%)        I&O's Detail    09 Sep 2021 07:01  -  10 Sep 2021 07:00  --------------------------------------------------------  IN:    Lactated Ringers: 1500 mL  Total IN: 1500 mL    OUT:    Oral Fluid: 0 mL  Total OUT: 0 mL    Total NET: 1500 mL          Daily     Daily     LABS:                        15.5   14.95 )-----------( 176      ( 10 Sep 2021 07:27 )             46.1     09-10    135  |  100  |  8   ----------------------------<  106<H>  3.9   |  22  |  0.72    Ca    9.6      10 Sep 2021 07:27  Phos  2.1     09-10  Mg     1.9     09-10    TPro  7.3  /  Alb  4.3  /  TBili  4.5<H>  /  DBili  0.3<H>  /  AST  17  /  ALT  18  /  AlkPhos  48  09-10      Urinalysis Basic - ( 09 Sep 2021 11:33 )    Color: Light Yellow / Appearance: Clear / S.012 / pH: x  Gluc: x / Ketone: Negative  / Bili: Negative / Urobili: Negative   Blood: x / Protein: Negative / Nitrite: Negative   Leuk Esterase: Negative / RBC: 1 /hpf / WBC 1 /HPF   Sq Epi: x / Non Sq Epi: 1 /hpf / Bacteria: Negative                PHYSICAL EXAM:  Constitutional: well developed, well nourished, NAD  Respiratory: CTA bilaterally  Gastrointestinal: abdomen soft, mild RUQ tenderness, nondistended. No obvious masses. No peritonitis  Extremities: FROM, warm  Neurological: intact, non-focal  Skin: no gross lesions           SURGERY DAILY PROGRESS NOTE:     SUBJECTIVE/ROS: Patient seen and examined, admits to moderate RUQ abdominal pain  Denies nausea, vomiting, chest pain, shortness of breath     MEDICATIONS  (STANDING):  atorvastatin 80 milliGRAM(s) Oral at bedtime  ciprofloxacin   IVPB      ciprofloxacin   IVPB 400 milliGRAM(s) IV Intermittent every 12 hours  enoxaparin Injectable 40 milliGRAM(s) SubCutaneous daily  influenza   Vaccine 0.5 milliLiter(s) IntraMuscular once  lactated ringers. 1000 milliLiter(s) (125 mL/Hr) IV Continuous <Continuous>  metroNIDAZOLE  IVPB 500 milliGRAM(s) IV Intermittent every 8 hours  metroNIDAZOLE  IVPB        MEDICATIONS  (PRN):      OBJECTIVE:    Vital Signs Last 24 Hrs  T(C): 37 (10 Sep 2021 05:08), Max: 37.3 (09 Sep 2021 17:39)  T(F): 98.6 (10 Sep 2021 05:08), Max: 99.1 (09 Sep 2021 17:39)  HR: 93 (10 Sep 2021 05:08) (63 - 96)  BP: 142/86 (10 Sep 2021 05:08) (120/75 - 155/87)  BP(mean): --  RR: 18 (10 Sep 2021 05:08) (15 - 20)  SpO2: 95% (10 Sep 2021 05:08) (94% - 100%)        I&O's Detail    09 Sep 2021 07:01  -  10 Sep 2021 07:00  --------------------------------------------------------  IN:    Lactated Ringers: 1500 mL  Total IN: 1500 mL    OUT:    Oral Fluid: 0 mL  Total OUT: 0 mL    Total NET: 1500 mL          Daily     Daily     LABS:                        15.5   14.95 )-----------( 176      ( 10 Sep 2021 07:27 )             46.1     09-10    135  |  100  |  8   ----------------------------<  106<H>  3.9   |  22  |  0.72    Ca    9.6      10 Sep 2021 07:27  Phos  2.1     09-10  Mg     1.9     09-10    TPro  7.3  /  Alb  4.3  /  TBili  4.5<H>  /  DBili  0.3<H>  /  AST  17  /  ALT  18  /  AlkPhos  48  09-10      Urinalysis Basic - ( 09 Sep 2021 11:33 )    Color: Light Yellow / Appearance: Clear / S.012 / pH: x  Gluc: x / Ketone: Negative  / Bili: Negative / Urobili: Negative   Blood: x / Protein: Negative / Nitrite: Negative   Leuk Esterase: Negative / RBC: 1 /hpf / WBC 1 /HPF   Sq Epi: x / Non Sq Epi: 1 /hpf / Bacteria: Negative                PHYSICAL EXAM:  Constitutional: well developed, well nourished, NAD  Respiratory: nonlabored   Gastrointestinal: abdomen soft, mild RUQ tenderness, nondistended. No obvious masses. No peritonitis  Extremities: FROM, warm  Neurological: intact, non-focal  Skin: no gross lesions

## 2021-09-12 ENCOUNTER — TRANSCRIPTION ENCOUNTER (OUTPATIENT)
Age: 51
End: 2021-09-12

## 2021-09-12 VITALS
TEMPERATURE: 98 F | RESPIRATION RATE: 18 BRPM | OXYGEN SATURATION: 95 % | DIASTOLIC BLOOD PRESSURE: 76 MMHG | SYSTOLIC BLOOD PRESSURE: 126 MMHG | HEART RATE: 98 BPM

## 2021-09-12 LAB
ALBUMIN SERPL ELPH-MCNC: 4 G/DL — SIGNIFICANT CHANGE UP (ref 3.3–5)
ALP SERPL-CCNC: 54 U/L — SIGNIFICANT CHANGE UP (ref 40–120)
ALT FLD-CCNC: 42 U/L — SIGNIFICANT CHANGE UP (ref 10–45)
ANION GAP SERPL CALC-SCNC: 14 MMOL/L — SIGNIFICANT CHANGE UP (ref 5–17)
AST SERPL-CCNC: 49 U/L — HIGH (ref 10–40)
BILIRUB DIRECT SERPL-MCNC: 0.3 MG/DL — HIGH (ref 0–0.2)
BILIRUB INDIRECT FLD-MCNC: 1.4 MG/DL — HIGH (ref 0.2–1)
BILIRUB SERPL-MCNC: 1.7 MG/DL — HIGH (ref 0.2–1.2)
BUN SERPL-MCNC: 12 MG/DL — SIGNIFICANT CHANGE UP (ref 7–23)
CALCIUM SERPL-MCNC: 9.4 MG/DL — SIGNIFICANT CHANGE UP (ref 8.4–10.5)
CHLORIDE SERPL-SCNC: 102 MMOL/L — SIGNIFICANT CHANGE UP (ref 96–108)
CO2 SERPL-SCNC: 23 MMOL/L — SIGNIFICANT CHANGE UP (ref 22–31)
CREAT SERPL-MCNC: 0.74 MG/DL — SIGNIFICANT CHANGE UP (ref 0.5–1.3)
GLUCOSE SERPL-MCNC: 135 MG/DL — HIGH (ref 70–99)
HCT VFR BLD CALC: 45.3 % — SIGNIFICANT CHANGE UP (ref 39–50)
HGB BLD-MCNC: 15.3 G/DL — SIGNIFICANT CHANGE UP (ref 13–17)
MAGNESIUM SERPL-MCNC: 2.1 MG/DL — SIGNIFICANT CHANGE UP (ref 1.6–2.6)
MCHC RBC-ENTMCNC: 30.4 PG — SIGNIFICANT CHANGE UP (ref 27–34)
MCHC RBC-ENTMCNC: 33.8 GM/DL — SIGNIFICANT CHANGE UP (ref 32–36)
MCV RBC AUTO: 89.9 FL — SIGNIFICANT CHANGE UP (ref 80–100)
NRBC # BLD: 0 /100 WBCS — SIGNIFICANT CHANGE UP (ref 0–0)
PHOSPHATE SERPL-MCNC: 2.9 MG/DL — SIGNIFICANT CHANGE UP (ref 2.5–4.5)
PLATELET # BLD AUTO: 243 K/UL — SIGNIFICANT CHANGE UP (ref 150–400)
POTASSIUM SERPL-MCNC: 4.1 MMOL/L — SIGNIFICANT CHANGE UP (ref 3.5–5.3)
POTASSIUM SERPL-SCNC: 4.1 MMOL/L — SIGNIFICANT CHANGE UP (ref 3.5–5.3)
PROT SERPL-MCNC: 7.4 G/DL — SIGNIFICANT CHANGE UP (ref 6–8.3)
RBC # BLD: 5.04 M/UL — SIGNIFICANT CHANGE UP (ref 4.2–5.8)
RBC # FLD: 12.9 % — SIGNIFICANT CHANGE UP (ref 10.3–14.5)
SODIUM SERPL-SCNC: 139 MMOL/L — SIGNIFICANT CHANGE UP (ref 135–145)
WBC # BLD: 15.96 K/UL — HIGH (ref 3.8–10.5)
WBC # FLD AUTO: 15.96 K/UL — HIGH (ref 3.8–10.5)

## 2021-09-12 RX ORDER — ACETAMINOPHEN 500 MG
2 TABLET ORAL
Qty: 0 | Refills: 0 | DISCHARGE
Start: 2021-09-12

## 2021-09-12 RX ORDER — OXYCODONE HYDROCHLORIDE 5 MG/1
1 TABLET ORAL
Qty: 10 | Refills: 0
Start: 2021-09-12

## 2021-09-12 RX ADMIN — SODIUM CHLORIDE 3 MILLILITER(S): 9 INJECTION INTRAMUSCULAR; INTRAVENOUS; SUBCUTANEOUS at 05:31

## 2021-09-12 NOTE — DISCHARGE NOTE PROVIDER - HOSPITAL COURSE
You were diagnosed with acute cholecystitis and underwent laparoscopic cholecystectomy in the OR on 9/11. The patient tolerated the procedure well. Post-operatively the patient was sent to the PACU. The patient was hemodynamically stable and was transferred to a surgical floor. The patient's pain was controlled by IV pain medications and then by PO pain medications. The patient was advanced to a regular diet and tolerated it well. The patient was placed on home medications. At the time of discharge, the patient was hemodynamically stable, was tolerating PO diet, was voiding urine and passing stool, was ambulating, and was comfortable with adequate pain control. The patient was instructed to follow up with Dr. Perez within 2 weeks after discharge from the hospital. The patient/family felt comfortable with discharge. The patient was discharged home with a prescription for oxycodone. The patient had no other issues.

## 2021-09-12 NOTE — DISCHARGE NOTE PROVIDER - NSDCFUADDINST_GEN_ALL_CORE_FT
You may shower. Pat Dry abdomen. Leave the white steri strips in place, they will fall off on their own in approximately 5-7 days.  Please call the office to make an appointment with Dr. Perez within 2 weeks of discharge. Please call your PCP to make an appointment and see them within 1 week of discharge from the hospital.

## 2021-09-12 NOTE — DISCHARGE NOTE NURSING/CASE MANAGEMENT/SOCIAL WORK - NSDCPNINST_GEN_ALL_CORE
If any fevers, chills, uncontrollable pain, drainage from incision sites, or inability to have a bowel movement notify MD.

## 2021-09-12 NOTE — DISCHARGE NOTE NURSING/CASE MANAGEMENT/SOCIAL WORK - PATIENT PORTAL LINK FT
You can access the FollowMyHealth Patient Portal offered by [facility] by registering at the following website: [email or web address]. By joining [facility]’s FollowMyHealth portal, you will also be able to view your health information using other applications (apps) compatible with our system.

## 2021-09-12 NOTE — DISCHARGE NOTE PROVIDER - NSDCQMSTROKE_NEU_ALL_CORE
Prep Survey      Responses   [facility] facility patient discharged from?  [place]   Is LACE score < 7 ?  No   Eligibility  Readm Mgmt   Discharge diagnosis  Abnormal PET of right lung, lung mass [s/p MEDIASTINOSCOPY, RIGHT THORACOSCOPY WITH WEDGE RESECTION WITH DAVINCI ROBOT ]   Does the patient have one of the following disease processes/diagnoses(primary or secondary)?  Cardiothoracic surgery   Does the patient have Home health ordered?  No   Is there a DME ordered?  No   Comments regarding appointments  Scheduled per AVS   Prep survey completed?  Yes          Leyla Khan RN         No

## 2021-09-12 NOTE — DISCHARGE NOTE PROVIDER - NSDCMRMEDTOKEN_GEN_ALL_CORE_FT
acetaminophen 325 mg oral tablet: 2 tab(s) orally every 6 hours, As needed, Mild Pain (1 - 3)  oxyCODONE 5 mg oral tablet: 1 tab(s) orally every 6 hours MDD:4 tabs  rosuvastatin 20 mg oral tablet: 1 tab(s) orally once a day

## 2021-09-12 NOTE — PROGRESS NOTE ADULT - ASSESSMENT
51y M PMH HTN, HLD, ?Gilbert's presents complaining of epigastric pains/RUQ pain since last night. Physical exam, labs (elevated WBC), and imaging concerning for acute cholecystitis. Given elevated T-bili and unconfirmed history of Gilbert's, will obtain MRCP to rule out choledocholithiasis before proceeding with cholecystectomy.  now pod1 s/p lap tony.     Plan:  - pain control  - reg diet  - f/u AM labs  - DVT PPx: Lovenox  - fu am labs, replete prn  - dc home today      ACS #[id number]

## 2021-09-12 NOTE — PROGRESS NOTE ADULT - SUBJECTIVE AND OBJECTIVE BOX
Surgery Progress Note    SUBJECTIVE: Pt seen and examined at bedside. Patient comfortable and in no-apparent distress. No nausea, vomiting, diarrhea. Pain is controlled.    Vital Signs Last 24 Hrs  T(C): 36.4 (12 Sep 2021 01:49), Max: 37.3 (11 Sep 2021 20:52)  T(F): 97.5 (12 Sep 2021 01:49), Max: 99.2 (11 Sep 2021 20:52)  HR: 85 (12 Sep 2021 01:49) (76 - 94)  BP: 164/90 (12 Sep 2021 01:49) (138/87 - 164/90)  BP(mean): 106 (11 Sep 2021 13:15) (103 - 108)  RR: 18 (12 Sep 2021 01:49) (16 - 19)  SpO2: 94% (12 Sep 2021 01:49) (91% - 97%)    PHYSICAL EXAM:  Constitutional: well developed, well nourished, NAD  Respiratory: nonlabored   Gastrointestinal: abdomen soft, mild RUQ tenderness, nondistended. No obvious masses. No peritonitis  Extremities: FROM, warm  Neurological: intact, non-focal  Skin: no gross lesions    LABS:                        14.6   12.47 )-----------( 175      ( 11 Sep 2021 06:06 )             42.9     09-11    135  |  99  |  8   ----------------------------<  102<H>  3.7   |  23  |  0.77    Ca    9.3      11 Sep 2021 06:06  Phos  2.4     09-11  Mg     2.1     09-11    TPro  7.0  /  Alb  3.9  /  TBili  3.8<H>  /  DBili  0.3<H>  /  AST  16  /  ALT  17  /  AlkPhos  53  09-11    INs and OUTs:    09-10-21 @ 07:01  -  09-11-21 @ 07:00  --------------------------------------------------------  IN: 3075 mL / OUT: 0 mL / NET: 3075 mL    09-11-21 @ 07:01  -  09-12-21 @ 03:05  --------------------------------------------------------  IN: 480 mL / OUT: 1300 mL / NET: -820 mL    Assessment and Plan:   · Assessment	  HPI: 51y M PMH HTN, HLD, ?Nitish's presents complaining of epigastric pains/RUQ pain since last night. Physical exam, labs (elevated WBC), and imaging concerning for acute cholecystitis. Given elevated T-bili and unconfirmed history of Gilbert's, will obtain MRCP to rule out choledocholithiasis before proceeding with cholecystectomy.        - OR today for a lap tony, possible open  - NPO/IVF  - IV Abx: Cipro/Flagyl  - f/u AM labs  - DVT PPx: Lovenox  - fu am labs, replete prn      ACS #[id number] Surgery Progress Note    SUBJECTIVE: Pt seen and examined at bedside. Patient comfortable and in no-apparent distress. No nausea, vomiting, diarrhea. Pain is controlled.    Vital Signs Last 24 Hrs  T(C): 36.4 (12 Sep 2021 01:49), Max: 37.3 (11 Sep 2021 20:52)  T(F): 97.5 (12 Sep 2021 01:49), Max: 99.2 (11 Sep 2021 20:52)  HR: 85 (12 Sep 2021 01:49) (76 - 94)  BP: 164/90 (12 Sep 2021 01:49) (138/87 - 164/90)  BP(mean): 106 (11 Sep 2021 13:15) (103 - 108)  RR: 18 (12 Sep 2021 01:49) (16 - 19)  SpO2: 94% (12 Sep 2021 01:49) (91% - 97%)    PHYSICAL EXAM:  Constitutional: well developed, well nourished, NAD  Respiratory: nonlabored   Gastrointestinal: abdomen soft, mild RUQ tenderness, nondistended. No obvious masses. No peritonitis  Extremities: FROM, warm  Neurological: intact, non-focal  Skin: no gross lesions    LABS:                        14.6   12.47 )-----------( 175      ( 11 Sep 2021 06:06 )             42.9     09-11    135  |  99  |  8   ----------------------------<  102<H>  3.7   |  23  |  0.77    Ca    9.3      11 Sep 2021 06:06  Phos  2.4     09-11  Mg     2.1     09-11    TPro  7.0  /  Alb  3.9  /  TBili  3.8<H>  /  DBili  0.3<H>  /  AST  16  /  ALT  17  /  AlkPhos  53  09-11    INs and OUTs:    09-10-21 @ 07:01  -  09-11-21 @ 07:00  --------------------------------------------------------  IN: 3075 mL / OUT: 0 mL / NET: 3075 mL    09-11-21 @ 07:01  -  09-12-21 @ 03:05  --------------------------------------------------------  IN: 480 mL / OUT: 1300 mL / NET: -820 mL

## 2021-09-12 NOTE — DISCHARGE NOTE PROVIDER - CARE PROVIDER_API CALL
Gian Wagoner)  Surgery; Surgical Critical Care  [address], [facility] [address]  [place] [address]  Phone: [phone]  Fax: [phone]  Follow Up Time: 2 weeks

## 2021-09-15 LAB — SURGICAL PATHOLOGY STUDY: SIGNIFICANT CHANGE UP

## 2023-05-12 ENCOUNTER — NON-APPOINTMENT (OUTPATIENT)
Age: 53
End: 2023-05-12

## 2023-05-12 ENCOUNTER — APPOINTMENT (OUTPATIENT)
Dept: CARDIOLOGY | Facility: CLINIC | Age: 53
End: 2023-05-12
Payer: COMMERCIAL

## 2023-05-12 VITALS
SYSTOLIC BLOOD PRESSURE: 130 MMHG | HEIGHT: 71 IN | OXYGEN SATURATION: 96 % | HEART RATE: 75 BPM | DIASTOLIC BLOOD PRESSURE: 78 MMHG | WEIGHT: 264 LBS | BODY MASS INDEX: 36.96 KG/M2

## 2023-05-12 VITALS
SYSTOLIC BLOOD PRESSURE: 130 MMHG | DIASTOLIC BLOOD PRESSURE: 78 MMHG | BODY MASS INDEX: 36.96 KG/M2 | HEIGHT: 71 IN | WEIGHT: 264 LBS

## 2023-05-12 DIAGNOSIS — Z91.89 OTHER SPECIFIED PERSONAL RISK FACTORS, NOT ELSEWHERE CLASSIFIED: ICD-10-CM

## 2023-05-12 DIAGNOSIS — R94.31 ABNORMAL ELECTROCARDIOGRAM [ECG] [EKG]: ICD-10-CM

## 2023-05-12 PROCEDURE — 99214 OFFICE O/P EST MOD 30 MIN: CPT

## 2023-05-12 PROCEDURE — 93000 ELECTROCARDIOGRAM COMPLETE: CPT

## 2023-05-15 ENCOUNTER — TRANSCRIPTION ENCOUNTER (OUTPATIENT)
Age: 53
End: 2023-05-15

## 2023-05-15 LAB
CHOLEST SERPL-MCNC: 188 MG/DL
ESTIMATED AVERAGE GLUCOSE: 100 MG/DL
HBA1C MFR BLD HPLC: 5.1 %
HDLC SERPL-MCNC: 45 MG/DL
LDLC SERPL CALC-MCNC: 120 MG/DL
NONHDLC SERPL-MCNC: 143 MG/DL
TRIGL SERPL-MCNC: 113 MG/DL

## 2023-05-16 ENCOUNTER — APPOINTMENT (OUTPATIENT)
Dept: ELECTROPHYSIOLOGY | Facility: CLINIC | Age: 53
End: 2023-05-16
Payer: COMMERCIAL

## 2023-05-16 PROCEDURE — 95800 SLP STDY UNATTENDED: CPT | Mod: TC

## 2023-06-02 ENCOUNTER — APPOINTMENT (OUTPATIENT)
Dept: PULMONOLOGY | Facility: CLINIC | Age: 53
End: 2023-06-02
Payer: COMMERCIAL

## 2023-06-02 VITALS
HEART RATE: 67 BPM | SYSTOLIC BLOOD PRESSURE: 168 MMHG | WEIGHT: 264 LBS | TEMPERATURE: 98.2 F | BODY MASS INDEX: 36.96 KG/M2 | HEIGHT: 71 IN | OXYGEN SATURATION: 97 % | DIASTOLIC BLOOD PRESSURE: 98 MMHG

## 2023-06-02 PROCEDURE — 99203 OFFICE O/P NEW LOW 30 MIN: CPT

## 2023-06-07 NOTE — REVIEW OF SYSTEMS
[Obesity] : obesity [Difficulty Initiating Sleep] : no difficulty falling asleep [Difficulty Maintaining Sleep] : no difficulty maintaining sleep [Lower Extremity Discomfort] : no lower extremity discomfort [Unusual Sleep Behavior] : no unusual sleep behavior [Hypersomnolence] : not sleeping much more than usual [Negative] : Psychiatric [FreeTextEntry3] : per hpi [FreeTextEntry8] : per hpi [FreeTextEntry9] : per hpi

## 2023-06-07 NOTE — PHYSICAL EXAM
[General Appearance - Well Developed] : well developed [General Appearance - Well Nourished] : well nourished [General Appearance - In No Acute Distress] : no acute distress [Normal Conjunctiva] : the conjunctiva exhibited no abnormalities [Eyelids - No Xanthelasma] : the eyelids demonstrated no xanthelasmas [Low Lying Soft Palate] : low lying soft palate [Enlarged Base of the Tongue] : enlargement of the base of the tongue [III] : III [Neck Appearance] : the appearance of the neck was normal [Heart Rate And Rhythm] : heart rate was normal and rhythm regular [Heart Sounds] : normal S1 and S2 [] : no respiratory distress [Respiration, Rhythm And Depth] : normal respiratory rhythm and effort [Exaggerated Use Of Accessory Muscles For Inspiration] : no accessory muscle use [Auscultation Breath Sounds / Voice Sounds] : lungs were clear to auscultation bilaterally [Abnormal Walk] : normal gait [Motor Tone] : muscle strength and tone were normal [Nail Clubbing] : no clubbing of the fingernails [Cyanosis, Localized] : no localized cyanosis [Skin Color & Pigmentation] : normal skin color and pigmentation [Cranial Nerves] : cranial nerves 2-12 were intact [Motor Exam] : the motor exam was normal [Oriented To Time, Place, And Person] : oriented to person, place, and time [Impaired Insight] : insight and judgment were intact [Affect] : the affect was normal [Mood] : the mood was normal

## 2023-06-07 NOTE — ASSESSMENT
[FreeTextEntry1] : Plan:\par Severe ADRIANA\par Will send the patient for a CPAP titration at the [facility] sleep disorders center to evaluate for the presence of sleep disordered breathing. Explained the rationale for diagnosis and treatment of sleep apnea including its effect on quality of life and long term cardiovascular risk. \par Counseled on the importance of weight loss and its positive impact on the severity of sleep apnea.\par Above discussed at length, all questions answered, he appears to understand and will call for results 1 week after completion of the study.\par \par \par \par

## 2023-06-07 NOTE — CONSULT LETTER
[Dear  ___] : Dear  [unfilled], [Consult Letter:] : I had the pleasure of evaluating your patient, [unfilled]. [( Thank you for referring [unfilled] for consultation for _____ )] : Thank you for referring [unfilled] for consultation for [unfilled] [Please see my note below.] : Please see my note below. [Consult Closing:] : Thank you very much for allowing me to participate in the care of this patient.  If you have any questions, please do not hesitate to contact me. [Sincerely,] : Sincerely, [FreeTextEntry2] : Dr. Adams [FreeTextEntry3] : Floridalma Doyle MD, FAASM\par  of Medicine\par Associate , Fellowship in Pulmonary and Critical Care Medicine\par Division of Pulmonary, Critical Care & Sleep Medicine\par [employer] at [facility]\par \par \par

## 2023-06-18 NOTE — ASSESSMENT
[FreeTextEntry1] : Impression:\par Obesity\par h/o hyperlipidemia\par At risk for obstructive sleep apnea\par \par Plan:\par Home sleep study\par Echo (b/c of LVH on ECG; has never been hypertensive here)\par Check lipids, AIx\par Counseled on heart-healthy diet\par \par \par Addendum:\par LDL cholesterol is 120 mg% despite taking rosuvastatin 20 mg/d. The additional decrease in LDL by going from rosuvastatin 20 mg/d to 40 mg/d is minimal, and not likely to achieve the goal of < 70 mg%. Will therefore add a PCSK9 inhibitor to the medical regimen.

## 2023-06-18 NOTE — PHYSICAL EXAM
[Normal Conjunctiva] : normal conjunctiva [Soft] : abdomen soft [Normal] : moves all extremities, no focal deficits, normal speech [de-identified] : Obese

## 2023-06-18 NOTE — REASON FOR VISIT
[FreeTextEntry1] : F/U: 53 year old man last seen 26 months ago. Problems at that time were as follows:\par \par Palpitations - no arrhythmia ever found, and has had none since.\par Obesity\par Possible ADRIANA - never had HST; still would like to pursue this. \par Hyperlipidemia. Currently taking rosuvastatin 20 mg/d. \par \par Mr. Kendrick presents today for a "check". He has no symptoms. He is walking four hours per day with no difficulty. He never smoked. He still snores, and is often tired during the day. He would like to be screened for ADRIANA.

## 2023-06-18 NOTE — CARDIOLOGY SUMMARY
[de-identified] : Sinus rhythm @ 74/min. Left ventricular hypertrophy. No interval change from previous ECG. \par

## 2023-06-23 ENCOUNTER — APPOINTMENT (OUTPATIENT)
Dept: CV DIAGNOSITCS | Facility: HOSPITAL | Age: 53
End: 2023-06-23

## 2023-06-23 ENCOUNTER — OUTPATIENT (OUTPATIENT)
Dept: OUTPATIENT SERVICES | Facility: HOSPITAL | Age: 53
LOS: 1 days | End: 2023-06-23
Payer: COMMERCIAL

## 2023-06-23 DIAGNOSIS — R94.31 ABNORMAL ELECTROCARDIOGRAM [ECG] [EKG]: ICD-10-CM

## 2023-06-23 PROCEDURE — 93306 TTE W/DOPPLER COMPLETE: CPT | Mod: 26

## 2023-06-23 PROCEDURE — 93306 TTE W/DOPPLER COMPLETE: CPT

## 2023-06-23 PROCEDURE — 76376 3D RENDER W/INTRP POSTPROCES: CPT

## 2023-06-23 PROCEDURE — 93356 MYOCRD STRAIN IMG SPCKL TRCK: CPT

## 2023-08-16 ENCOUNTER — RX RENEWAL (OUTPATIENT)
Age: 53
End: 2023-08-16

## 2023-08-17 ENCOUNTER — OUTPATIENT (OUTPATIENT)
Dept: OUTPATIENT SERVICES | Facility: HOSPITAL | Age: 53
LOS: 1 days | End: 2023-08-17
Payer: COMMERCIAL

## 2023-08-17 ENCOUNTER — APPOINTMENT (OUTPATIENT)
Dept: SLEEP CENTER | Facility: CLINIC | Age: 53
End: 2023-08-17
Payer: COMMERCIAL

## 2023-08-17 PROCEDURE — 95811 POLYSOM 6/>YRS CPAP 4/> PARM: CPT | Mod: 26

## 2023-08-17 PROCEDURE — 95811 POLYSOM 6/>YRS CPAP 4/> PARM: CPT

## 2023-08-25 DIAGNOSIS — G47.33 OBSTRUCTIVE SLEEP APNEA (ADULT) (PEDIATRIC): ICD-10-CM

## 2023-08-29 ENCOUNTER — APPOINTMENT (OUTPATIENT)
Dept: PULMONOLOGY | Facility: CLINIC | Age: 53
End: 2023-08-29
Payer: COMMERCIAL

## 2023-08-29 DIAGNOSIS — G47.33 OBSTRUCTIVE SLEEP APNEA (ADULT) (PEDIATRIC): ICD-10-CM

## 2023-08-29 PROCEDURE — 99442: CPT

## 2023-11-06 ENCOUNTER — APPOINTMENT (OUTPATIENT)
Dept: PULMONOLOGY | Facility: CLINIC | Age: 53
End: 2023-11-06
Payer: COMMERCIAL

## 2023-11-06 DIAGNOSIS — G47.33 OBSTRUCTIVE SLEEP APNEA (ADULT) (PEDIATRIC): ICD-10-CM

## 2023-11-06 PROCEDURE — 99213 OFFICE O/P EST LOW 20 MIN: CPT | Mod: 95

## 2024-02-18 ENCOUNTER — EMERGENCY (EMERGENCY)
Facility: HOSPITAL | Age: 54
LOS: 1 days | Discharge: HOME | End: 2024-02-18
Attending: STUDENT IN AN ORGANIZED HEALTH CARE EDUCATION/TRAINING PROGRAM
Payer: COMMERCIAL

## 2024-02-18 VITALS
OXYGEN SATURATION: 98 % | SYSTOLIC BLOOD PRESSURE: 195 MMHG | HEIGHT: 71 IN | HEART RATE: 81 BPM | DIASTOLIC BLOOD PRESSURE: 111 MMHG | TEMPERATURE: 99 F | RESPIRATION RATE: 20 BRPM | WEIGHT: 270.07 LBS

## 2024-02-18 VITALS
RESPIRATION RATE: 20 BRPM | HEART RATE: 71 BPM | SYSTOLIC BLOOD PRESSURE: 171 MMHG | OXYGEN SATURATION: 98 % | DIASTOLIC BLOOD PRESSURE: 101 MMHG

## 2024-02-18 LAB
ALBUMIN SERPL ELPH-MCNC: 4.6 G/DL — SIGNIFICANT CHANGE UP (ref 3.3–5)
ALP SERPL-CCNC: 87 U/L — SIGNIFICANT CHANGE UP (ref 40–120)
ALT FLD-CCNC: 34 U/L — SIGNIFICANT CHANGE UP (ref 10–45)
ANION GAP SERPL CALC-SCNC: 11 MMOL/L — SIGNIFICANT CHANGE UP (ref 5–17)
AST SERPL-CCNC: 30 U/L — SIGNIFICANT CHANGE UP (ref 10–40)
BASE EXCESS BLDV CALC-SCNC: 4.3 MMOL/L — HIGH (ref -2–3)
BASOPHILS # BLD AUTO: 0.05 K/UL — SIGNIFICANT CHANGE UP (ref 0–0.2)
BASOPHILS NFR BLD AUTO: 0.6 % — SIGNIFICANT CHANGE UP (ref 0–2)
BILIRUB SERPL-MCNC: 1.4 MG/DL — HIGH (ref 0.2–1.2)
BUN SERPL-MCNC: 12 MG/DL — SIGNIFICANT CHANGE UP (ref 7–23)
CA-I SERPL-SCNC: 1.18 MMOL/L — SIGNIFICANT CHANGE UP (ref 1.15–1.33)
CALCIUM SERPL-MCNC: 9.6 MG/DL — SIGNIFICANT CHANGE UP (ref 8.4–10.5)
CHLORIDE BLDV-SCNC: 102 MMOL/L — SIGNIFICANT CHANGE UP (ref 96–108)
CHLORIDE SERPL-SCNC: 102 MMOL/L — SIGNIFICANT CHANGE UP (ref 96–108)
CO2 BLDV-SCNC: 33 MMOL/L — HIGH (ref 22–26)
CO2 SERPL-SCNC: 27 MMOL/L — SIGNIFICANT CHANGE UP (ref 22–31)
CREAT SERPL-MCNC: 0.97 MG/DL — SIGNIFICANT CHANGE UP (ref 0.5–1.3)
EGFR: 94 ML/MIN/1.73M2 — SIGNIFICANT CHANGE UP
EOSINOPHIL # BLD AUTO: 0.36 K/UL — SIGNIFICANT CHANGE UP (ref 0–0.5)
EOSINOPHIL NFR BLD AUTO: 4.6 % — SIGNIFICANT CHANGE UP (ref 0–6)
GAS PNL BLDV: 135 MMOL/L — LOW (ref 136–145)
GAS PNL BLDV: SIGNIFICANT CHANGE UP
GAS PNL BLDV: SIGNIFICANT CHANGE UP
GLUCOSE BLDV-MCNC: 134 MG/DL — HIGH (ref 70–99)
GLUCOSE SERPL-MCNC: 137 MG/DL — HIGH (ref 70–99)
HCO3 BLDV-SCNC: 31 MMOL/L — HIGH (ref 22–29)
HCT VFR BLD CALC: 45.2 % — SIGNIFICANT CHANGE UP (ref 39–50)
HCT VFR BLDA CALC: 47 % — SIGNIFICANT CHANGE UP (ref 39–51)
HGB BLD CALC-MCNC: 15.7 G/DL — SIGNIFICANT CHANGE UP (ref 12.6–17.4)
HGB BLD-MCNC: 15.6 G/DL — SIGNIFICANT CHANGE UP (ref 13–17)
IMM GRANULOCYTES NFR BLD AUTO: 0.3 % — SIGNIFICANT CHANGE UP (ref 0–0.9)
LACTATE BLDV-MCNC: 1.3 MMOL/L — SIGNIFICANT CHANGE UP (ref 0.5–2)
LIDOCAIN IGE QN: 17 U/L — SIGNIFICANT CHANGE UP (ref 7–60)
LYMPHOCYTES # BLD AUTO: 1.75 K/UL — SIGNIFICANT CHANGE UP (ref 1–3.3)
LYMPHOCYTES # BLD AUTO: 22.5 % — SIGNIFICANT CHANGE UP (ref 13–44)
MCHC RBC-ENTMCNC: 30.3 PG — SIGNIFICANT CHANGE UP (ref 27–34)
MCHC RBC-ENTMCNC: 34.5 GM/DL — SIGNIFICANT CHANGE UP (ref 32–36)
MCV RBC AUTO: 87.8 FL — SIGNIFICANT CHANGE UP (ref 80–100)
MONOCYTES # BLD AUTO: 0.61 K/UL — SIGNIFICANT CHANGE UP (ref 0–0.9)
MONOCYTES NFR BLD AUTO: 7.8 % — SIGNIFICANT CHANGE UP (ref 2–14)
NEUTROPHILS # BLD AUTO: 5 K/UL — SIGNIFICANT CHANGE UP (ref 1.8–7.4)
NEUTROPHILS NFR BLD AUTO: 64.2 % — SIGNIFICANT CHANGE UP (ref 43–77)
NRBC # BLD: 0 /100 WBCS — SIGNIFICANT CHANGE UP (ref 0–0)
PCO2 BLDV: 54 MMHG — SIGNIFICANT CHANGE UP (ref 42–55)
PH BLDV: 7.37 — SIGNIFICANT CHANGE UP (ref 7.32–7.43)
PLATELET # BLD AUTO: 181 K/UL — SIGNIFICANT CHANGE UP (ref 150–400)
PO2 BLDV: 39 MMHG — SIGNIFICANT CHANGE UP (ref 25–45)
POTASSIUM BLDV-SCNC: 4 MMOL/L — SIGNIFICANT CHANGE UP (ref 3.5–5.1)
POTASSIUM SERPL-MCNC: 4 MMOL/L — SIGNIFICANT CHANGE UP (ref 3.5–5.3)
POTASSIUM SERPL-SCNC: 4 MMOL/L — SIGNIFICANT CHANGE UP (ref 3.5–5.3)
PROT SERPL-MCNC: 7.5 G/DL — SIGNIFICANT CHANGE UP (ref 6–8.3)
RBC # BLD: 5.15 M/UL — SIGNIFICANT CHANGE UP (ref 4.2–5.8)
RBC # FLD: 13 % — SIGNIFICANT CHANGE UP (ref 10.3–14.5)
SAO2 % BLDV: 67.5 % — SIGNIFICANT CHANGE UP (ref 67–88)
SODIUM SERPL-SCNC: 140 MMOL/L — SIGNIFICANT CHANGE UP (ref 135–145)
WBC # BLD: 7.79 K/UL — SIGNIFICANT CHANGE UP (ref 3.8–10.5)
WBC # FLD AUTO: 7.79 K/UL — SIGNIFICANT CHANGE UP (ref 3.8–10.5)

## 2024-02-18 PROCEDURE — 82330 ASSAY OF CALCIUM: CPT

## 2024-02-18 PROCEDURE — 84132 ASSAY OF SERUM POTASSIUM: CPT

## 2024-02-18 PROCEDURE — 84295 ASSAY OF SERUM SODIUM: CPT

## 2024-02-18 PROCEDURE — 83690 ASSAY OF LIPASE: CPT

## 2024-02-18 PROCEDURE — 99285 EMERGENCY DEPT VISIT HI MDM: CPT

## 2024-02-18 PROCEDURE — G1004: CPT

## 2024-02-18 PROCEDURE — 71045 X-RAY EXAM CHEST 1 VIEW: CPT | Mod: 26

## 2024-02-18 PROCEDURE — 93005 ELECTROCARDIOGRAM TRACING: CPT

## 2024-02-18 PROCEDURE — 82947 ASSAY GLUCOSE BLOOD QUANT: CPT

## 2024-02-18 PROCEDURE — 80053 COMPREHEN METABOLIC PANEL: CPT

## 2024-02-18 PROCEDURE — 82435 ASSAY OF BLOOD CHLORIDE: CPT

## 2024-02-18 PROCEDURE — 71045 X-RAY EXAM CHEST 1 VIEW: CPT

## 2024-02-18 PROCEDURE — 85018 HEMOGLOBIN: CPT

## 2024-02-18 PROCEDURE — 85025 COMPLETE CBC W/AUTO DIFF WBC: CPT

## 2024-02-18 PROCEDURE — 83605 ASSAY OF LACTIC ACID: CPT

## 2024-02-18 PROCEDURE — 85014 HEMATOCRIT: CPT

## 2024-02-18 PROCEDURE — 99285 EMERGENCY DEPT VISIT HI MDM: CPT | Mod: 25

## 2024-02-18 PROCEDURE — 82803 BLOOD GASES ANY COMBINATION: CPT

## 2024-02-18 PROCEDURE — 99282 EMERGENCY DEPT VISIT SF MDM: CPT | Mod: GC

## 2024-02-18 PROCEDURE — 74177 CT ABD & PELVIS W/CONTRAST: CPT | Mod: 26,MG

## 2024-02-18 PROCEDURE — 74177 CT ABD & PELVIS W/CONTRAST: CPT | Mod: MG

## 2024-02-18 NOTE — ED PROVIDER NOTE - OBJECTIVE STATEMENT
53-year-old male with history of hypertension and hyperlipidemia presents to the ED with abdominal pain and rash for the past day.  Patient endorses that pain began yesterday rash began today 4 hours ago.  Patient denies nausea, vomiting, fever, chills, chest pain, SOB, palpitations, diarrhea, cough.  Patient is on a statin.  There is a red rash in the lower quadrant crossing the midline, circumscribed mass felt on palpation.

## 2024-02-18 NOTE — ED PROVIDER NOTE - CLINICAL SUMMARY MEDICAL DECISION MAKING FREE TEXT BOX
53-year-old male with history of hypertension and hyperlipidemia presents to the ED with abdominal pain and rash for the past day.  Patient endorses that pain began yesterday rash began today 4 hours ago.  Patient denies nausea, vomiting, fever, chills, chest pain, SOB, palpitations, diarrhea, cough.  Patient is on a statin.  There is a red rash in the lower quadrant crossing the midline, circumscribed mass felt on palpation.  Differential includes hernia.  Orders include labs, CT abdomen and pelvis, urinalysis.  Dispo pending labs, imaging and reassessment.

## 2024-02-18 NOTE — ED ADULT NURSE NOTE - NSFALLRISKFACTORS_ED_ALL_ED
----- Message from Ashley Benson sent at 1/8/2024  9:21 AM CST -----  PT WENT TO ER ON FRIDAY NIGHT AND WAS TOLD IF HE DIDN'T FEEL ANY BETTER BY MONDAY TO CALL DR BLAKE YUNG  [phone]     No indicators present

## 2024-02-18 NOTE — ED ADULT NURSE NOTE - OBJECTIVE STATEMENT
53 y.o M AAO4 ambulatory presents to the ED c.o lower abd redness and swelling. Pt states it started two days ago and it getting worse but not in pain thought. Pt has PMH of HTH, HLD. Pt denies CP, SOB, HA, vision changes, n/v/d, fevers chills, abdominal pain. Upon assessment, abdomen soft and nontender, +strong peripheral pulses, moving all extremities without difficulty.

## 2024-02-18 NOTE — ED PROVIDER NOTE - PHYSICAL EXAMINATION
Const: Awake, alert, no acute distress.  Well appearing.  Moving comfortably on stretcher.  HEENT: NC/AT.  Moist mucous membranes.  No pharyngeal erythema, no exudates.  Eyes: Extraocular movements intact b/l.  Conjunctiva pink.  No scleral icterus.  Cardiac: Regular rate and regular rhythm. S1 S2 present.  Peripheral pulses 2+ and symmetric. No LE edema.  Resp: Speaking in full sentences. No evidence of respiratory distress.  Breath sounds clear to auscultation b/l. Normal chest excursion.   Abd:  Erythematous rash in the lower quadrant crossing the midline, circumscribed mass felt on palpation.  Non-distended, no overlying skin changes.  Soft, no guarding, no rigidity, no rebound tenderness.  No palpable masses.  Normal bowel sounds in all 4 quadrants.  Skin: Normal coloration.  No abrasions or lacerations.  Neuro: Awake, alert & oriented x 3.  Moves all extremities spontaneously.  No focal deficits.

## 2024-02-18 NOTE — ED PROVIDER NOTE - ATTENDING CONTRIBUTION TO CARE
Esau Starks DO: I have personally performed a face to face medical and diagnostic evaluation of the patient. I have discussed with and reviewed the Resident's and/or ACP's and/or Medical/PA/NP student's note and agree with the History, ROS, Physical Exam and MDM unless otherwise indicated. A brief summary of my personal evaluation and impression can be found below.     53M with history of hypertension and hyperlipidemia hx laparoscopic tony presents to the ED with abdominal pain and rash for the past day.  Patient endorses that pain began yesterday rash began today 4 hours ago.  Patient denies nausea, vomiting, fever, chills, chest pain, SOB, palpitations, diarrhea, cough.  Patient is on a statin.  There is a red rash in the lower quadrant crossing the midline, circumscribed mass felt on palpation. Pt passing gas and stool    CONSTITUTIONAL: NAD  SKIN: erythema lateral to umbilicus on L side of abdomen  HEAD: NCAT  NECK: Supple; non tender. Full ROM.  CARD: RRR, no murmurs.  RESP: clear to ausculation b/l. No crackles or wheezing.  ABD: skin findings above, minimally tender to palpation, palpable 3cm mass felt just L of midline irreducible  MSK: Full ROM, no bony tenderness, no pedal edema, no calf tenderness  PSYCH: Cooperative, appropriate.     concern for possible hernia w/ strangulation given pt's palpable midline mass, erythema less likely panniculitis and more likely 2.2 local inflammation, less likely urinary, less likely biliary/pancreatic in origin, will get labs including vbg to eval for lactate, likely surgery consult, probable admission. Pts pain controlled at this time. Pt passing gas, less likely obstruction. No fevers/chills to suggest infectious etiology at this time.

## 2024-02-18 NOTE — ED PROVIDER NOTE - NSFOLLOWUPINSTRUCTIONS_ED_ALL_ED_FT
It was a pleasure caring for you today!    You were seen in the ER today for abdominal pain. Your Ct abdomen showed evidence of a supraumbilical hernia w/ concern for strangulation. The hernia was reduced by surgery. You can follow up with Dr. Eliot Salazar within one week.     Please follow up with your primary care doctor within 1 - 3 days. Call and let them know you were seen in the ER today.   Bring the results of your blood work and imaging with you to your appointment, if applicable.    For pain, please take acetaminophen 650 mg every 6 hours for pain. Additionally, you can also take ibuprofen 400 mg every 6-8 hours for pain.    Return to the ER for any worsening symptoms or concerns, including chest pain, shortness of breath, lightheadedness, weakness, or any other concerns.

## 2024-02-18 NOTE — ED PROVIDER NOTE - PROGRESS NOTE DETAILS
Tita Colby MD ([id number]) Ct abdomen shows evidence of hernia w/ concern for strangulation. Surgery consulted and will come see the pt.

## 2024-02-18 NOTE — ED ADULT TRIAGE NOTE - MEANS OF ARRIVAL
ED HPI GENERAL MEDICAL PROBLEM





- General


Chief Complaint: Cardiovascular Problem


Stated Complaint: POSSIBLE HEART ATTACK


Time Seen by Provider: 09/18/19 07:39


Source of Information: Reports: Patient


History Limitations: Reports: No Limitations





- History of Present Illness


INITIAL COMMENTS - FREE TEXT/NARRATIVE: 


History of present illness:


[]Patient has a history of an MI 2 years ago with one stent placed in [place] 

this morning while sitting at the breakfast table sipping coffee with friends 

patient became sweaty and nauseous which is how he presented with his MI 2 

years ago. 





Review of systems: 


As per history of present illness and below otherwise all systems reviewed and 

negative.





Past medical history: 


As per history of present illness and as reviewed below otherwise 

noncontributory.





Surgical history: 


As per history of present illness and as reviewed below otherwise 

noncontributory.





Social history: 


No reported history of drug or alcohol abuse.





Family history: 


As per history of present illness and as reviewed below otherwise 

noncontributory.





Physical exam:


General: Well developed, well nourished in NAD


HEENT: Atraumatic, normocephalic, pupils reactive, negative for conjunctival 

pallor or scleral icterus, mucous membranes moist, throat clear, neck supple, 

nontender, trachea midline.


Lungs: Clear to auscultation, breath sounds equal bilaterally, chest nontender.


Heart: S1S2, regular, negative for clicks, rubs, or JVD.


Abdomen: NABS, Soft, nondistended, nontender. Negative for masses or 

hepatosplenomegaly. Negative for costovertebral tenderness.


Pelvis: Stable nontender.


Genitourinary: Deferred.


Rectal: Deferred.


Extremities: Atraumatic, negative for cords or calf pain. Neurovascular 

unremarkable.


Neuro: Awake, alert, oriented. Cranial nerves II through XII unremarkable. 

Cerebellum unremarkable. Motor and sensory unremarkable throughout. Exam 

nonfocal.


Skin:warm and dry





Diagnostics:


EKG, chest x-ray, CBC, chemistry, troponin





Therapeutics:


Aspirin given prior to arrival





ED Course:


Stable consulted hospitalist who will admit





Impression: 


acute coronary syndrome





Prescriptions:


none





Plan:


Mid to hospitalist service rule out MI actually





Definitive disposition and diagnosis as appropriate pending reevaluation and 

review of above.





  ** chest


Pain Score (Numeric/FACES): 0





- Related Data


 Allergies











Allergy/AdvReac Type Severity Reaction Status Date / Time


 


codeine Allergy  Nausea Verified 09/18/19 10:58


 


wheat Allergy  Other Verified 09/18/19 10:58


 


pain medications Allergy  Confusion Uncoded 09/18/19 10:58











Home Meds: 


 Home Meds





Aspirin 81 mg PO DAILY 09/18/19 [History]


Esomeprazole Magnesium [Nexium 24Hr] 20 mg PO DAILY 09/18/19 [History]


Losartan Potassium 25 mg PO DAILY 09/18/19 [History]


atorvaSTATin [Lipitor] 40 mg PO BEDTIME 09/18/19 [History]











ED ROS GENERAL





- Review of Systems


Review Of Systems: See Below





ED EXAM, GENERAL





- Physical Exam


Exam: See Below





Course





- Vital Signs


Last Recorded V/S: 


 Last Vital Signs











Temp  98.9 F   09/18/19 12:00


 


Pulse  62   09/18/19 12:00


 


Resp  16   09/18/19 12:00


 


BP  107/63   09/18/19 12:00


 


Pulse Ox  98   09/18/19 12:00














- Orders/Labs/Meds


Orders: 


 Active Orders 24 hr











 Category Date Time Status


 


 Patient Status [ADT] Stat ADT  09/18/19 08:29 Active


 


 Cardiac Monitoring [RC] .AS DIRECTED Care  09/18/19 07:45 Active


 


 EKG Documentation Completion [RC] STAT Care  09/18/19 07:45 Active


 


 Sodium Chloride 0.9% [Saline Flush] Med  09/18/19 07:45 Active





 10 ml FLUSH ASDIRECTED PRN   


 


 Sodium Chloride 0.9% [Saline Flush] Med  09/18/19 07:45 Active





 2.5 ml FLUSH ASDIRECTED PRN   


 


 Saline Lock Insert [OM.PC] Stat Oth  09/18/19 07:45 Ordered








 Medication Orders





Acetaminophen (Tylenol)  650 mg PO Q4H PRN


   PRN Reason: Pain/Fever


Aspirin (Aspirin)  81 mg PO DAILY JG


Atorvastatin Calcium (Lipitor)  40 mg PO BEDTIME JG


Enoxaparin Sodium (Lovenox)  40 mg SUBCUT Q24H JG


   Last Admin: 09/18/19 11:14  Dose: 40 mg


Sodium Chloride (Normal Saline)  1,000 mls @ 125 mls/hr IV ASDIRECTED JG


   Stop: 09/18/19 17:14


   Last Admin: 09/18/19 11:13  Dose: 125 mls/hr


Losartan Potassium (Cozaar)  25 mg PO DAILY JG


Ondansetron HCl (Zofran)  4 mg IVPUSH Q4H PRN


   PRN Reason: Nausea/Vomiting


Sodium Chloride (Saline Flush)  10 ml FLUSH ASDIRECTED PRN


   PRN Reason: Keep Vein Open


Sodium Chloride (Saline Flush)  2.5 ml FLUSH ASDIRECTED PRN


   PRN Reason: Keep Vein Open








Labs: 


 Laboratory Tests











  09/18/19 09/18/19 Range/Units





  07:52 07:52 


 


WBC  7.98   (4.0-11.0)  K/uL


 


RBC  4.18 L   (4.50-5.90)  M/uL


 


Hgb  14.1   (13.0-17.0)  g/dL


 


Hct  41.5   (38.0-50.0)  %


 


MCV  99.3 H   (80.0-98.0)  fL


 


MCH  33.7 H   (27.0-32.0)  pg


 


MCHC  34.0   (31.0-37.0)  g/dL


 


RDW Std Deviation  46.8   (28.0-62.0)  fl


 


RDW Coeff of Nelda  13   (11.0-15.0)  %


 


Plt Count  176   (150-400)  K/uL


 


MPV  9.50   (7.40-12.00)  fL


 


Neut % (Auto)  85.8 H   (48.0-80.0)  %


 


Lymph % (Auto)  8.3 L   (16.0-40.0)  %


 


Mono % (Auto)  5.5   (0.0-15.0)  %


 


Eos % (Auto)  0.3   (0.0-7.0)  %


 


Baso % (Auto)  0.1   (0.0-1.5)  %


 


Neut # (Auto)  6.9 H   (1.4-5.7)  K/uL


 


Lymph # (Auto)  0.7   (0.6-2.4)  K/uL


 


Mono # (Auto)  0.4   (0.0-0.8)  K/uL


 


Eos # (Auto)  0.0   (0.0-0.7)  K/uL


 


Baso # (Auto)  0.0   (0.0-0.1)  K/uL


 


Nucleated RBC %  0.0   /100WBC


 


Nucleated RBCs #  0   K/uL


 


Sodium   133 L  (136-148)  mmol/L


 


Potassium   4.1  (3.5-5.1)  mmol/L


 


Chloride   100  ()  mmol/L


 


Carbon Dioxide   27.4  (21.0-32.0)  mmol/L


 


BUN   14  (7.0-18.0)  mg/dL


 


Creatinine   0.9  (0.8-1.3)  mg/dL


 


Est Cr Clr Drug Dosing   56.17  mL/min


 


Estimated GFR (MDRD)   > 60.0  ml/min


 


Glucose   123 H  ()  mg/dL


 


Calcium   10.5 H  (8.5-10.1)  mg/dL


 


Total Bilirubin   1.2 H  (0.2-1.0)  mg/dL


 


AST   25  (15-37)  IU/L


 


ALT   30  (14-63)  IU/L


 


Alkaline Phosphatase   64  ()  U/L


 


Troponin I   < 0.050  (0.000-0.056)  ng/mL


 


Total Protein   6.4  (6.4-8.2)  g/dL


 


Albumin   3.6  (3.4-5.0)  g/dL


 


Globulin   2.8  (2.6-4.0)  g/dL


 


Albumin/Globulin Ratio   1.3  (0.9-1.6)  











Meds: 


Medications











Generic Name Dose Route Start Last Admin





  Trade Name Freq  PRN Reason Stop Dose Admin


 


Acetaminophen  650 mg  09/18/19 09:04  





  Tylenol  PO   





  Q4H PRN   





  Pain/Fever   





     





     





     


 


Aspirin  81 mg  09/19/19 09:00  





  Aspirin  PO   





  DAILY [facility]   





     





     





     





     


 


Atorvastatin Calcium  40 mg  09/18/19 21:00  





  Lipitor  PO   





  BEDTIME [facility]   





     





     





     





     


 


Enoxaparin Sodium  40 mg  09/18/19 11:00  09/18/19 11:14





  Lovenox  SUBCUT   40 mg





  Q24H JG   Administration





     





     





     





     


 


Sodium Chloride  1,000 mls @ 125 mls/hr  09/18/19 09:15  09/18/19 11:13





  Normal Saline  IV  09/18/19 17:14  125 mls/hr





  ASDIRECTED JG   Administration





     





     





     





     


 


Losartan Potassium  25 mg  09/19/19 21:00  





  Cozaar  PO   





  DAILY [facility]   





     





     





     





     


 


Ondansetron HCl  4 mg  09/18/19 09:04  





  Zofran  IVPUSH   





  Q4H PRN   





  Nausea/Vomiting   





     





     





     


 


Sodium Chloride  10 ml  09/18/19 07:45  





  Saline Flush  FLUSH   





  ASDIRECTED PRN   





  Keep Vein Open   





     





     





     


 


Sodium Chloride  2.5 ml  09/18/19 07:45  





  Saline Flush  FLUSH   





  ASDIRECTED PRN   





  Keep Vein Open   





     





     





     














Discontinued Medications














Generic Name Dose Route Start Last Admin





  Trade Name Freq  PRN Reason Stop Dose Admin


 


Aspirin  324 mg  09/18/19 07:49  09/18/19 07:57





  Aspirin  PO  09/18/19 07:50  Not Given





  ONETIME ONE   





     





     





     





     


 


Enoxaparin Sodium  40 mg  09/18/19 09:00  09/18/19 11:05





  Lovenox  SUBCUT   Not Given





  Q24H [facility]   





     





     





     





     


 


Omeprazole  20 mg  09/20/19 09:00  





  Omeprazole  PO   





  DAILY JG   





     





     





     





     














Departure





- Departure


Time of Disposition: 16:58


Disposition: Refer to Observation


Condition: Good


Clinical Impression: 


 Acute coronary syndrome








- My Orders


Last 24 Hours: 


My Active Orders





09/18/19 07:45


Cardiac Monitoring [RC] .AS DIRECTED 


EKG Documentation Completion [RC] STAT 


Sodium Chloride 0.9% [Saline Flush]   10 ml FLUSH ASDIRECTED PRN 


Sodium Chloride 0.9% [Saline Flush]   2.5 ml FLUSH ASDIRECTED PRN 


Saline Lock Insert [OM.PC] Stat 





09/18/19 08:29


Patient Status [ADT] Stat 














- Assessment/Plan


Last 24 Hours: 


My Active Orders





09/18/19 07:45


Cardiac Monitoring [RC] .AS DIRECTED 


EKG Documentation Completion [RC] STAT 


Sodium Chloride 0.9% [Saline Flush]   10 ml FLUSH ASDIRECTED PRN 


Sodium Chloride 0.9% [Saline Flush]   2.5 ml FLUSH ASDIRECTED PRN 


Saline Lock Insert [OM.PC] Stat 





09/18/19 08:29


Patient Status [ADT] Stat ambulatory

## 2024-02-18 NOTE — ED PROVIDER NOTE - PATIENT PORTAL LINK FT
You can access the FollowMyHealth Patient Portal offered by [facility] by registering at the following website: [email or web address]. By joining [email or web address] FollowMyHealth portal, you will also be able to view your health information using other applications (apps) compatible with our system.

## 2024-02-18 NOTE — ED ADULT TRIAGE NOTE - TEMPERATURE IN CELSIUS (DEGREES C)
Bed in lowest position, wheels locked, appropriate side rails in place/Call bell, personal items and telephone in reach/Instruct patient to call for assistance before getting out of bed or chair/Non-slip footwear when patient is out of bed/[place] to call system/Physically safe environment - no spills, clutter or unnecessary equipment/Purposeful Proactive Rounding/Room/bathroom lighting operational, light cord in reach 37

## 2024-02-19 RX ORDER — MORPHINE SULFATE 50 MG/1
2 CAPSULE, EXTENDED RELEASE ORAL ONCE
Refills: 0 | Status: DISCONTINUED | OUTPATIENT
Start: 2024-02-19 | End: 2024-02-19

## 2024-02-19 RX ORDER — MORPHINE SULFATE 50 MG/1
4 CAPSULE, EXTENDED RELEASE ORAL ONCE
Refills: 0 | Status: DISCONTINUED | OUTPATIENT
Start: 2024-02-19 | End: 2024-02-19

## 2024-02-19 NOTE — CONSULT NOTE ADULT - ASSESSMENT
53M hx cholecystectomy p/w fat containing supraumbilical hernia, reduced at bedside    -patient ok for discharge home  -take Tylenol/Motrin as needed for pain  -follow up with Dr. Salazar next Monday in clinic for elective repair planning  -Patient should return to ED with severe abdominal pain, N/V, poor PO tolerance     Seen and examined with Dr. Salazar    ACS  [id number]

## 2024-02-19 NOTE — CONSULT NOTE ADULT - ATTENDING COMMENTS
Patient examined by me in ED.   Presents with abdominal pain.   Able to tolerate diet and having bowel function.   Vitals stable.   Abdomen non distended. Small jill-umbilical hernia with mild erythema. Reduced on my exam.   Labs grossly unremarkable.   CT prior to my evaluation shows small fat containing hernia.     A/P: Small fat containing hernia. Reduced in ED. Pain improved. Patient offered admission for repair vs F/up outpatient. Elective for outpatient follow up to plan for elective repair.

## 2024-02-19 NOTE — CONSULT NOTE ADULT - SUBJECTIVE AND OBJECTIVE BOX
HPI: 53M hx HLD, cholecystectomy 2021 p/w abdominal pain and erythema. Patient noticed yesterday he developed a supraumbilical bulge, tender to palpation. He has not had a hernia there in the past. Overlying skin became erythematous. Denies N/V/F/C. Patient passing gas and having BMs. Tolerating a diet.      PMHx: HLD      PSHx: s/p cholecystectomy      Medications (inpatient): morphine  - Injectable 4 milliGRAM(s) IV Push Once    Medications (PRN):  Allergies: penicillins (Unknown)  (Intolerances: )  Social Hx:   Family Hx:     Physical Exam  T(C): 37  HR: 71 (71 - 81)  BP: 171/101 (171/101 - 195/111)  RR: 20 (20 - 20)  SpO2: 98% (98% - 98%)  Tmax: T(C): , Max: 37 (02-18-24 @ 21:43)    General: well developed, well nourished, NAD  Neuro: alert and oriented, no focal deficits, moves all extremities spontaneously  HEENT: NCAT, EOMI, anicteric, mucosa moist  Respiratory: airway patent, respirations unlabored  CVS: regular rate and rhythm  Abdomen: soft, nondistended, supraumbilical hernia with mild skin erythema, minimally tender to deep palpation, reduced   Extremities: no edema, sensation and movement grossly intact  Skin: warm, dry, appropriate color    Labs:                        15.6   7.79  )-----------( 181      ( 18 Feb 2024 22:29 )             45.2       02-18    140  |  102  |  12  ----------------------------<  137<H>  4.0   |  27  |  0.97    Ca    9.6      18 Feb 2024 22:29    TPro  7.5  /  Alb  4.6  /  TBili  1.4<H>  /  DBili  x   /  AST  30  /  ALT  34  /  AlkPhos  87  02-18    Urinalysis Basic - ( 18 Feb 2024 22:29 )    Color: x / Appearance: x / SG: x / pH: x  Gluc: 137 mg/dL / Ketone: x  / Bili: x / Urobili: x   Blood: x / Protein: x / Nitrite: x   Leuk Esterase: x / RBC: x / WBC x   Sq Epi: x / Non Sq Epi: x / Bacteria: x            Imaging and other studies:  < from: CT Abdomen and Pelvis w/ IV Cont (02.18.24 @ 22:38) >  FINDINGS:  LOWER CHEST: Partially visualized coronary calcifications.    LIVER: Within normal limits.  BILE DUCTS: Normal caliber.  GALLBLADDER: Cholecystectomy.  SPLEEN: Prominent size, measuring 18 cm in maximum transaxial dimension,   not substantially changed.  PANCREAS: Within normal limits.  ADRENALS: Within normal limits.  KIDNEYS/URETERS: No hydronephrosis or nephrolithiasis. Small left upper   pole cyst again noted.    BLADDER: Within normal limits.  REPRODUCTIVE ORGANS: Prostate within normal limits.    BOWEL: No bowel obstruction. Appendix is normal.  PERITONEUM: No ascites.  VESSELS: Trace atherosclerotic changes.  RETROPERITONEUM/LYMPH NODES: No lymphadenopathy.  ABDOMINAL WALL: 5 x 3 x 4 cm fat-containing supraumbilical hernia   extending just to left of midline with hernia neck measuring 2 x 2 cm.   Fat stranding and trace fluid within the hernia sac as well as fat   stranding around the hernia sacwith overlying mild skin thickening. No   bowel within the hernia sac. Small bilateral fat-containing inguinal   hernias versus inguinal canal lipomas.  BONES: Within normal limits.    IMPRESSION:  5 cm fat-containing supraumbilical hernia with inflammatory changes   within and around the hernia sac, suspicious for strangulation.    < end of copied text >

## 2024-02-22 ENCOUNTER — TRANSCRIPTION ENCOUNTER (OUTPATIENT)
Age: 54
End: 2024-02-22

## 2024-02-26 ENCOUNTER — APPOINTMENT (OUTPATIENT)
Dept: SURGERY | Facility: CLINIC | Age: 54
End: 2024-02-26
Payer: COMMERCIAL

## 2024-02-26 PROCEDURE — 99213 OFFICE O/P EST LOW 20 MIN: CPT

## 2024-02-26 RX ORDER — ALIROCUMAB 75 MG/ML
75 INJECTION, SOLUTION SUBCUTANEOUS
Qty: 2 | Refills: 7 | Status: ACTIVE | COMMUNITY
Start: 2023-05-13

## 2024-04-30 ENCOUNTER — APPOINTMENT (OUTPATIENT)
Dept: TRAUMA SURGERY | Facility: HOSPITAL | Age: 54
End: 2024-04-30
Payer: COMMERCIAL

## 2024-04-30 VITALS
SYSTOLIC BLOOD PRESSURE: 159 MMHG | WEIGHT: 273 LBS | HEIGHT: 70 IN | BODY MASS INDEX: 39.08 KG/M2 | HEART RATE: 73 BPM | TEMPERATURE: 98.2 F | DIASTOLIC BLOOD PRESSURE: 86 MMHG

## 2024-04-30 PROCEDURE — 99204 OFFICE O/P NEW MOD 45 MIN: CPT

## 2024-05-01 ENCOUNTER — NON-APPOINTMENT (OUTPATIENT)
Age: 54
End: 2024-05-01

## 2024-05-04 NOTE — ED ADULT NURSE NOTE - NSFALLUNIVINTERV_ED_ALL_ED
Bed/Stretcher in lowest position, wheels locked, appropriate side rails in place/Call bell, personal items and telephone in reach/Instruct patient to call for assistance before getting out of bed/chair/stretcher/Non-slip footwear applied when patient is off stretcher/[place] to call system/Physically safe environment - no spills, clutter or unnecessary equipment/Purposeful proactive rounding/Room/bathroom lighting operational, light cord in reach
PAST MEDICAL HISTORY:  No pertinent past medical history

## 2024-05-07 NOTE — HISTORY OF PRESENT ILLNESS
[de-identified] : 55yo M s/p lap tony presenting with umbilical incisional hernia. Pt reports mild discomfort from the hernia, especially when lifting or straining. He had one episode of incarceration of fat which sent him to the ER but was reduced at bedside. Tolerating regular diet, having regular bowel movements.

## 2024-05-07 NOTE — ASSESSMENT
[FreeTextEntry1] : 55yo M s/p lap tony presenting with umbilical incisional hernia. PT with BMI 39, has been gaining weight over the past year (approximately 10lbs). To reduce the chance of hernia recurrence, would recommend pre-operative weight loss, either with diet/exercise or medically assisted. Offered pt referral to medical weight loss, however he would like to try on his own first. Goal for BMI 35, 250lbs (25lb weight loss).

## 2024-05-07 NOTE — PLAN
[FreeTextEntry1] : - Follow up in 2-3 months for weight check in  - Can provide medical weight loss referral if pt desires  - Will plan for robotic incisional hernia repair once nearing goal weight

## 2024-05-07 NOTE — PHYSICAL EXAM
[Normal Breath Sounds] : Normal breath sounds [Normal Heart Sounds] : normal heart sounds [No Rash or Lesion] : No rash or lesion [Alert] : alert [Oriented to Person] : oriented to person [Oriented to Place] : oriented to place [Oriented to Time] : oriented to time [Calm] : calm [de-identified] : NAD [de-identified] : Soft, nondistended, nontender, small reducible umbilical hernia  [de-identified] : No deformities, ambulating without difficulty

## 2024-05-09 ENCOUNTER — APPOINTMENT (OUTPATIENT)
Dept: CARDIOLOGY | Facility: CLINIC | Age: 54
End: 2024-05-09

## 2024-05-25 ENCOUNTER — RX RENEWAL (OUTPATIENT)
Age: 54
End: 2024-05-25

## 2024-05-25 RX ORDER — ROSUVASTATIN CALCIUM 20 MG/1
20 TABLET, FILM COATED ORAL
Qty: 90 | Refills: 0 | Status: ACTIVE | COMMUNITY
Start: 2021-03-24 | End: 1900-01-01

## 2024-11-01 ENCOUNTER — RX RENEWAL (OUTPATIENT)
Age: 54
End: 2024-11-01

## 2025-01-16 ENCOUNTER — TRANSCRIPTION ENCOUNTER (OUTPATIENT)
Age: 55
End: 2025-01-16

## 2025-01-23 ENCOUNTER — NON-APPOINTMENT (OUTPATIENT)
Age: 55
End: 2025-01-23

## 2025-01-23 ENCOUNTER — APPOINTMENT (OUTPATIENT)
Dept: CARDIOLOGY | Facility: CLINIC | Age: 55
End: 2025-01-23

## 2025-01-23 VITALS — DIASTOLIC BLOOD PRESSURE: 88 MMHG | SYSTOLIC BLOOD PRESSURE: 154 MMHG

## 2025-01-23 VITALS
HEIGHT: 70 IN | SYSTOLIC BLOOD PRESSURE: 153 MMHG | BODY MASS INDEX: 37.22 KG/M2 | OXYGEN SATURATION: 98 % | HEART RATE: 61 BPM | DIASTOLIC BLOOD PRESSURE: 88 MMHG | WEIGHT: 260 LBS

## 2025-01-23 DIAGNOSIS — I10 ESSENTIAL (PRIMARY) HYPERTENSION: ICD-10-CM

## 2025-01-23 PROCEDURE — 93000 ELECTROCARDIOGRAM COMPLETE: CPT

## 2025-01-23 PROCEDURE — 99215 OFFICE O/P EST HI 40 MIN: CPT

## 2025-01-23 RX ORDER — VALSARTAN 160 MG/1
160 TABLET, COATED ORAL DAILY
Qty: 90 | Refills: 3 | Status: ACTIVE | COMMUNITY
Start: 2025-01-23 | End: 1900-01-01

## 2025-01-24 LAB
ANION GAP SERPL CALC-SCNC: 11 MMOL/L
BUN SERPL-MCNC: 15 MG/DL
CALCIUM SERPL-MCNC: 9.9 MG/DL
CHLORIDE SERPL-SCNC: 101 MMOL/L
CHOLEST SERPL-MCNC: 129 MG/DL
CO2 SERPL-SCNC: 29 MMOL/L
CREAT SERPL-MCNC: 0.86 MG/DL
EGFR: 104 ML/MIN/1.73M2
ESTIMATED AVERAGE GLUCOSE: 105 MG/DL
GLUCOSE SERPL-MCNC: 73 MG/DL
HBA1C MFR BLD HPLC: 5.3 %
HCT VFR BLD CALC: 49.6 %
HDLC SERPL-MCNC: 47 MG/DL
HGB BLD-MCNC: 16.5 G/DL
LDLC SERPL CALC-MCNC: 62 MG/DL
MCHC RBC-ENTMCNC: 30.3 PG
MCHC RBC-ENTMCNC: 33.3 G/DL
MCV RBC AUTO: 91 FL
NONHDLC SERPL-MCNC: 82 MG/DL
PLATELET # BLD AUTO: 224 K/UL
POTASSIUM SERPL-SCNC: 4.5 MMOL/L
RBC # BLD: 5.45 M/UL
RBC # FLD: 13.3 %
SODIUM SERPL-SCNC: 140 MMOL/L
TRIGL SERPL-MCNC: 109 MG/DL
WBC # FLD AUTO: 7.84 K/UL

## 2025-01-25 ENCOUNTER — TRANSCRIPTION ENCOUNTER (OUTPATIENT)
Age: 55
End: 2025-01-25

## 2025-01-27 LAB — APO LP(A) SERPL-MCNC: 26.8 NMOL/L

## 2025-02-04 NOTE — ED ADULT TRIAGE NOTE - WEIGHT METHOD
Called and spoke with patient regarding surgery scheduling.  Information including dates and times given via phone.  
stated

## 2025-02-25 ENCOUNTER — NON-APPOINTMENT (OUTPATIENT)
Age: 55
End: 2025-02-25

## 2025-02-25 ENCOUNTER — APPOINTMENT (OUTPATIENT)
Dept: DERMATOLOGY | Facility: CLINIC | Age: 55
End: 2025-02-25
Payer: COMMERCIAL

## 2025-02-25 DIAGNOSIS — L71.9 ROSACEA, UNSPECIFIED: ICD-10-CM

## 2025-02-25 DIAGNOSIS — Z12.83 ENCOUNTER FOR SCREENING FOR MALIGNANT NEOPLASM OF SKIN: ICD-10-CM

## 2025-02-25 DIAGNOSIS — D22.9 MELANOCYTIC NEVI, UNSPECIFIED: ICD-10-CM

## 2025-02-25 PROCEDURE — 99204 OFFICE O/P NEW MOD 45 MIN: CPT

## 2025-02-25 RX ORDER — DOXYCYCLINE 40 MG/1
40 CAPSULE ORAL DAILY
Qty: 30 | Refills: 2 | Status: ACTIVE | COMMUNITY
Start: 2025-02-25 | End: 1900-01-01

## 2025-02-28 ENCOUNTER — NON-APPOINTMENT (OUTPATIENT)
Age: 55
End: 2025-02-28

## 2025-05-30 ENCOUNTER — APPOINTMENT (OUTPATIENT)
Dept: DERMATOLOGY | Facility: CLINIC | Age: 55
End: 2025-05-30